# Patient Record
Sex: FEMALE
[De-identification: names, ages, dates, MRNs, and addresses within clinical notes are randomized per-mention and may not be internally consistent; named-entity substitution may affect disease eponyms.]

---

## 2017-09-11 ENCOUNTER — APPOINTMENT (OUTPATIENT)
Dept: PULMONOLOGY | Facility: CLINIC | Age: 67
End: 2017-09-11
Payer: MEDICARE

## 2017-09-11 VITALS
TEMPERATURE: 98.6 F | DIASTOLIC BLOOD PRESSURE: 64 MMHG | RESPIRATION RATE: 16 BRPM | HEART RATE: 70 BPM | WEIGHT: 278 LBS | OXYGEN SATURATION: 96 % | BODY MASS INDEX: 49.26 KG/M2 | SYSTOLIC BLOOD PRESSURE: 156 MMHG | HEIGHT: 63 IN

## 2017-09-11 DIAGNOSIS — Z86.39 PERSONAL HISTORY OF OTHER ENDOCRINE, NUTRITIONAL AND METABOLIC DISEASE: ICD-10-CM

## 2017-09-11 DIAGNOSIS — Z87.891 PERSONAL HISTORY OF NICOTINE DEPENDENCE: ICD-10-CM

## 2017-09-11 DIAGNOSIS — Z87.442 PERSONAL HISTORY OF URINARY CALCULI: ICD-10-CM

## 2017-09-11 DIAGNOSIS — Z01.818 ENCOUNTER FOR OTHER PREPROCEDURAL EXAMINATION: ICD-10-CM

## 2017-09-11 DIAGNOSIS — B45.0 PULMONARY CRYPTOCOCCOSIS: ICD-10-CM

## 2017-09-11 PROCEDURE — 94727 GAS DIL/WSHOT DETER LNG VOL: CPT

## 2017-09-11 PROCEDURE — 99204 OFFICE O/P NEW MOD 45 MIN: CPT | Mod: 25

## 2017-09-11 PROCEDURE — 94729 DIFFUSING CAPACITY: CPT

## 2017-09-11 PROCEDURE — 94060 EVALUATION OF WHEEZING: CPT

## 2017-09-12 ENCOUNTER — TRANSCRIPTION ENCOUNTER (OUTPATIENT)
Age: 67
End: 2017-09-12

## 2019-03-18 VITALS
WEIGHT: 192.68 LBS | HEART RATE: 51 BPM | TEMPERATURE: 97 F | RESPIRATION RATE: 16 BRPM | SYSTOLIC BLOOD PRESSURE: 189 MMHG | OXYGEN SATURATION: 99 % | HEIGHT: 63 IN | DIASTOLIC BLOOD PRESSURE: 79 MMHG

## 2019-03-18 NOTE — ASU PREOP CHECKLIST - SELECT TESTS ORDERED
2/28/19 C&S/CMP/CXR/PT/PTT/INR/CBC/Urinalysis/EKG CXR/POCT Blood Glucose/CBC/2/28/19 C&S, /PT/PTT/CMP/Urinalysis/INR/EKG

## 2019-03-18 NOTE — ASU PATIENT PROFILE, ADULT - PMH
Degenerative disc disease, lumbar    Diabetes Mellitus II (ICD9 250.00)  new onset 1 month ago, diet control  last fingerstick 107  GERD (gastroesophageal reflux disease)    HTN (Hypertension) (ICD9 401.9)    Hyperlipemia    Morbid Obesity (ICD9 278.01)    Psoriasis (ICD9 696.1)  soritane   20 mg  Renal calculi

## 2019-03-18 NOTE — ASU PATIENT PROFILE, ADULT - PSH
Finger Swelling/Mass (ICD9 729.81)  Left Index  History of hand surgery    Renal Calculi  Percutaneous Stone Extraction(ICD9 592.0)  Right and Left  S/P Appendectomy (ICD9 V45.89)    S/P Cholecystectomy (ICD9 V45.79)    S/P Thoracotomy (ICD9 V45.89)  left lung for mass (cryptococcus)  S/P Tonsillectomy (ICD9 V45.89) Bilateral cataracts  2014  Finger Swelling/Mass (ICD9 729.81)  Left Index  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  Renal Calculi  Percutaneous Stone Extraction(ICD9 592.0)  Right and Left  S/P Appendectomy (ICD9 V45.89)    S/P Cholecystectomy (ICD9 V45.79)    S/P Thoracotomy (ICD9 V45.89)  left lung for mass (cryptococcus) 2009  S/P Tonsillectomy (ICD9 V45.89)

## 2019-03-19 ENCOUNTER — OUTPATIENT (OUTPATIENT)
Dept: OUTPATIENT SERVICES | Facility: HOSPITAL | Age: 69
LOS: 1 days | Discharge: HOME | End: 2019-03-19
Payer: MEDICARE

## 2019-03-19 ENCOUNTER — TRANSCRIPTION ENCOUNTER (OUTPATIENT)
Age: 69
End: 2019-03-19

## 2019-03-19 VITALS
SYSTOLIC BLOOD PRESSURE: 127 MMHG | DIASTOLIC BLOOD PRESSURE: 66 MMHG | HEART RATE: 57 BPM | RESPIRATION RATE: 16 BRPM | OXYGEN SATURATION: 98 %

## 2019-03-19 DIAGNOSIS — Z90.3 ACQUIRED ABSENCE OF STOMACH [PART OF]: Chronic | ICD-10-CM

## 2019-03-19 DIAGNOSIS — H26.9 UNSPECIFIED CATARACT: Chronic | ICD-10-CM

## 2019-03-19 LAB — GLUCOSE BLDC GLUCOMTR-MCNC: 118 MG/DL — HIGH (ref 70–99)

## 2019-03-19 PROCEDURE — 29826 SHO ARTHRS SRG DECOMPRESSION: CPT | Mod: RT

## 2019-03-19 PROCEDURE — 82962 GLUCOSE BLOOD TEST: CPT

## 2019-03-19 PROCEDURE — 29824 SHO ARTHRS SRG DSTL CLAVICLC: CPT | Mod: RT

## 2019-03-19 PROCEDURE — 29827 SHO ARTHRS SRG RT8TR CUF RPR: CPT | Mod: RT

## 2019-03-19 RX ORDER — OXYCODONE AND ACETAMINOPHEN 5; 325 MG/1; MG/1
1 TABLET ORAL EVERY 4 HOURS
Qty: 0 | Refills: 0 | Status: DISCONTINUED | OUTPATIENT
Start: 2019-03-19 | End: 2019-03-19

## 2019-03-19 RX ORDER — CEPHALEXIN 500 MG
1 CAPSULE ORAL
Qty: 28 | Refills: 0
Start: 2019-03-19 | End: 2019-03-25

## 2019-03-19 RX ORDER — CEPHALEXIN 500 MG
1 CAPSULE ORAL
Qty: 28 | Refills: 0 | OUTPATIENT
Start: 2019-03-19 | End: 2019-03-25

## 2019-03-19 NOTE — PACU DISCHARGE NOTE - COMMENTS
d/c instruction provided and reviewed with verbal understanding..patient moving right hanf fingers with + senation. cap refill < 3 sec and warm to touch

## 2019-10-28 PROBLEM — N20.0 CALCULUS OF KIDNEY: Chronic | Status: ACTIVE | Noted: 2019-03-18

## 2019-10-28 PROBLEM — K21.9 GASTRO-ESOPHAGEAL REFLUX DISEASE WITHOUT ESOPHAGITIS: Chronic | Status: ACTIVE | Noted: 2019-03-18

## 2019-11-04 ENCOUNTER — APPOINTMENT (OUTPATIENT)
Dept: ORTHOPEDIC SURGERY | Facility: CLINIC | Age: 69
End: 2019-11-04
Payer: MEDICARE

## 2019-11-04 VITALS — SYSTOLIC BLOOD PRESSURE: 172 MMHG | DIASTOLIC BLOOD PRESSURE: 91 MMHG | HEART RATE: 67 BPM

## 2019-11-04 PROCEDURE — 99204 OFFICE O/P NEW MOD 45 MIN: CPT

## 2019-11-04 NOTE — DISCUSSION/SUMMARY
[de-identified] : Referral to Dr. Swain for injections- pain management \par bilateral sacroiliitis\par all options discussed\par seeing pain management\par All options were discussed including rest, medicine, chiropractor, acupuncture, , PT , pain management, and last resort surgery. \par All questions were answered, all alternatives were discussed and the patient is in complete agreement with that plan. Follow-up appointment as instructed. Any issues and the patient will call or come in sooner.\par

## 2019-11-04 NOTE — HISTORY OF PRESENT ILLNESS
[Stable] : stable [de-identified] : 69 year old female presents for initial evaluation severe lower back pain x \par Status post lumbar L4-L5 fusion Dr Lemus 8/02/2016 [facility]\par spine injection 6/19/2019 [facility] \par no fever, chills, sweats, nausea vomiting no bowel movement or bladder dysfunction, no recent weight loss or gain no night pain. the history is in addition to the intake form i personally reviewed.\par

## 2019-11-04 NOTE — PHYSICAL EXAM
[Normal] : Gait: normal [de-identified] : 5 out of 5 motor strength, sensation is intact and symmetrical full range of motion flexion extension and rotation, no palpatory tenderness full range of motion of hips knees shoulders and elbows, no atrophy, negative straight leg raise, no pathological reflexes, no swelling, normal ambulation, no apparent distress, skin is intact, no palpable lymph nodes, no upper or lower extremity instability, alert and oriented x3 and normal mood. normal finger to nose test.\par Pain over bilateral SI joint.  [de-identified] : MRI lumbar-prior lumbar L4-5 fusion-reviewed with patient and

## 2019-11-04 NOTE — ADDENDUM
[FreeTextEntry1] : I, Min Alejandra, acted solely as a scribe for Dr. Nash on 11/04/2019  .\par  \par All medical record entries made by the scribe were at my, Dr. Nash, direction and personally dictated by me on 11/04/2019. I have reviewed the chart and agree that the record accurately reflects my personal performance of the history, physical exam, assessment and plan. I have also personally directed, reviewed, and agreed with the chart.\par

## 2020-03-03 ENCOUNTER — APPOINTMENT (OUTPATIENT)
Dept: ORTHOPEDIC SURGERY | Facility: CLINIC | Age: 70
End: 2020-03-03
Payer: MEDICARE

## 2020-03-03 VITALS
WEIGHT: 210 LBS | HEART RATE: 61 BPM | HEIGHT: 63 IN | BODY MASS INDEX: 37.21 KG/M2 | SYSTOLIC BLOOD PRESSURE: 186 MMHG | DIASTOLIC BLOOD PRESSURE: 77 MMHG

## 2020-03-03 PROCEDURE — 20610 DRAIN/INJ JOINT/BURSA W/O US: CPT | Mod: RT

## 2020-03-03 PROCEDURE — 99204 OFFICE O/P NEW MOD 45 MIN: CPT | Mod: 25

## 2020-03-04 NOTE — DISCUSSION/SUMMARY
[de-identified] : Discussed findings of today's exam and possible causes of patient's pain.  Educated patient on their most probable diagnosis of right shoulder impingement.  Reviewed possible courses of treatment, and we collaboratively decided best course of treatment at this time will include cortisone injection today (see procedure note).  Informed the patient that the numbing medicine in today's injection will last for about 4-6 hours. The steroid that was injected will start to work in 1 to 2 days, peak at 1-2 weeks, and may last up to 1-2 months.  Patient will be started on a course of physical therapy to restore normal range of motion and strength as tolerated.  Follow up as needed.  Patient appreciates and agrees with current plan.\par \par This note was generated using dragon medical dictation software.  A reasonable effort has been made for proofreading its contents, but typos may still remain.  If there are any questions or points of clarification needed please notify my office.

## 2020-03-04 NOTE — PROCEDURE
[de-identified] : Injection: Right  Shoulder Subacromial Space.\par Indication: Impingement.\par \par A discussion was had with the patient regarding this procedure and all questions were answered. All risks, benefits and alternatives were discussed. These included but were not limited to bleeding, infection, and allergic reaction.  A timeout was done to ensure correct side and pt agreed to the procedure.   Alcohol was used to clean the skin, and betadine was used to sterilize and prep the area in the posterior aspect of the shoulder. Ethyl chloride spray was then used as a topical anesthetic. A 22-gauge 1.5" needle was used to inject 2cc of 1% lidocaine without epinephrine and 1cc of 40mg/ml methylprednisolone into the subacromial space. A sterile bandage was then applied. The patient tolerated the procedure well and there were no complications.\par

## 2020-03-04 NOTE — HISTORY OF PRESENT ILLNESS
[de-identified] : Patient is here for right shoulder pain that began on 3/1/20 without inciting injury. She went to urgent care where xrays were taken that were negative for fracture. She has used Tylenol and topical treatments for her pain. She had an arthroscopic debridement in this shoulder about 4 years ago but has not had difficulty with it since. Denies N/T/R/Prior injury. \par \par The patient's past medical history, past surgical history, medications and allergies were reviewed by me today and documented accordingly. In addition, the patient's family and social history, which were noncontributory to this visit, were reviewed also. Intake form was reviewed. The patient has no family history of arthritis.

## 2020-03-04 NOTE — PHYSICAL EXAM
[de-identified] : Constitutional: Well-nourished, well-developed, No acute distress\par Respiratory:  Good respiratory effort, no SOB\par Lymphatic: No regional lymphadenopathy, no lymphedema\par Psychiatric: Pleasant and normal affect, alert and oriented x3\par Skin: Clean dry and intact B/L UE/LE\par Musculoskeletal: normal except where as noted in regional exam\par \par \par Left Shoulder:\par APPEARANCE: no marked deformities, no swelling or malalignment\par POSITIVE TENDERNESS: none\par NONTENDER: supraspinatus, infraspinatus, teres minor, LH biceps, anterior and posterior capsule, AC joint\par ROM: full & painless, no scapular winging or dyskinesia present\par RESISTIVE TESTING: painless 5/5 resisted flex/ext, empty can/ER/IR, horizontal abd/add \par SPECIAL TESTS: neg Drop Arm, neg Empty Can, neg Gordillo/Neers, neg Cohen's, neg Speeds, neg Apprehension, neg cross arm adduction, neg apley's scratch test\par Vasc: 2+ radial pulse\par Neuro: AIN, PIN, Ulnar nerve intact to motor, DTRs 2+/4 biceps, triceps, brachioradialis\par Sensation: Intact to light touch throughout\par B/L Elbows:  No asymmetry, malalignment, or swelling, Full ROM, 5/5 strength in flexion/ext, pronation/supination, Joints stable\par B/L Wrist and Hand:  No asymmetry, malalignment, or swelling, Full ROM, 5/5 strength in wrist and long finger flexion/ext, radial/ulnar deviation, Joints stable\par \par Right Shoulder:\par APPEARANCE: no marked deformities, no swelling or malalignment\par POSITIVE TENDERNESS: supraspinatus, long head biceps tendon\par NONTENDER:  infraspinatus, teres minor. biceps. anterior and posterior capsule. AC joint. \par ROM: full with mild painful arc past 60 degrees, no scapular winging or dyskinesia present\par RESISTIVE TESTING: MMT 4+/5 ER, Flexion and Empty can, 5/5 IR. painless 5/5 resisted ext, horizontal abd/add \par SPECIAL TESTS: + Gordillo and Neers, mildly + cross arm adduction, + Speeds, neg Cohen's, neg Drop Arm, neg Apprehension. neg apley's scratch test\par Vasc: 2+ radial pulse\par Neuro: AIN, PIN, Ulnar nerve intact to motor, DTRs 2+/4 biceps, triceps, brachioradialis\par Sensation: Intact to light touch throughout\par  [de-identified] : I reviewed and clinically correlated the following outside imaging studies,\par Outside x-rays, 3 views of the right shoulder, evidence of mild glenohumeral and AC joint osteoarthritis

## 2020-08-07 ENCOUNTER — APPOINTMENT (OUTPATIENT)
Dept: ORTHOPEDIC SURGERY | Facility: CLINIC | Age: 70
End: 2020-08-07
Payer: MEDICARE

## 2020-08-07 VITALS
HEART RATE: 67 BPM | HEIGHT: 63 IN | DIASTOLIC BLOOD PRESSURE: 71 MMHG | SYSTOLIC BLOOD PRESSURE: 194 MMHG | WEIGHT: 210 LBS | BODY MASS INDEX: 37.21 KG/M2 | TEMPERATURE: 98.2 F

## 2020-08-07 PROCEDURE — 99214 OFFICE O/P EST MOD 30 MIN: CPT

## 2020-08-17 ENCOUNTER — APPOINTMENT (OUTPATIENT)
Dept: ORTHOPEDIC SURGERY | Facility: CLINIC | Age: 70
End: 2020-08-17
Payer: MEDICARE

## 2020-08-17 VITALS — TEMPERATURE: 97.2 F

## 2020-08-17 VITALS
BODY MASS INDEX: 37.21 KG/M2 | WEIGHT: 210 LBS | HEIGHT: 63 IN | HEART RATE: 61 BPM | SYSTOLIC BLOOD PRESSURE: 185 MMHG | DIASTOLIC BLOOD PRESSURE: 66 MMHG

## 2020-08-17 PROCEDURE — 99214 OFFICE O/P EST MOD 30 MIN: CPT

## 2020-08-21 ENCOUNTER — APPOINTMENT (OUTPATIENT)
Dept: ORTHOPEDIC SURGERY | Facility: CLINIC | Age: 70
End: 2020-08-21
Payer: MEDICARE

## 2020-08-21 PROCEDURE — 20610 DRAIN/INJ JOINT/BURSA W/O US: CPT | Mod: RT

## 2020-08-21 PROCEDURE — 99213 OFFICE O/P EST LOW 20 MIN: CPT | Mod: 25

## 2020-08-21 NOTE — PROCEDURE
[de-identified] : Injection: Right  Shoulder Subacromial Space.\par Indication: Impingement.\par \par A discussion was had with the patient regarding this procedure and all questions were answered. All risks, benefits and alternatives were discussed. These included but were not limited to bleeding, infection, and allergic reaction.  A timeout was done to ensure correct side and pt agreed to the procedure.   Alcohol was used to clean the skin, and betadine was used to sterilize and prep the area in the posterior aspect of the shoulder. Ethyl chloride spray was then used as a topical anesthetic. A 22-gauge 1.5" needle was used to inject 2cc of 1% lidocaine without epinephrine and 1cc of 40mg/ml methylprednisolone into the subacromial space. A sterile bandage was then applied. The patient tolerated the procedure well and there were no complications.\par

## 2020-08-21 NOTE — PHYSICAL EXAM
[de-identified] : Constitutional: Well-nourished, well-developed, No acute distress\par Respiratory:  Good respiratory effort, no SOB\par Lymphatic: No regional lymphadenopathy, no lymphedema\par Psychiatric: Pleasant and normal affect, alert and oriented x3\par Skin: Clean dry and intact B/L UE/LE\par Musculoskeletal: normal except where as noted in regional exam\par \par Right Shoulder:\par APPEARANCE: no marked deformities, no swelling or malalignment\par POSITIVE TENDERNESS: supraspinatus, long head biceps tendon\par NONTENDER:  infraspinatus, teres minor. biceps. anterior and posterior capsule. AC joint. \par ROM: full with mild painful arc past 60 degrees, no scapular winging or dyskinesia present\par RESISTIVE TESTING: MMT 4+/5 ER, Flexion and Empty can, 5/5 IR. painless 5/5 resisted ext, horizontal abd/add \par SPECIAL TESTS: + Gordillo and Neers, mildly + cross arm adduction, + Speeds, neg Cohen's, neg Drop Arm, neg Apprehension. neg apley's scratch test\par Vasc: 2+ radial pulse\par Neuro: AIN, PIN, Ulnar nerve intact to motor, DTRs 2+/4 biceps, triceps, brachioradialis\par Sensation: Intact to light touch throughout\par

## 2020-08-21 NOTE — PHYSICAL EXAM
[de-identified] : Constitutional: Well-nourished, well-developed, No acute distress\par Respiratory:  Good respiratory effort, no SOB\par Lymphatic: No regional lymphadenopathy, no lymphedema\par Psychiatric: Pleasant and normal affect, alert and oriented x3\par Skin: Clean dry and intact B/L UE/LE\par Musculoskeletal: normal except where as noted in regional exam\par \par Right Shoulder:\par APPEARANCE: no marked deformities, no swelling or malalignment\par POSITIVE TENDERNESS: supraspinatus, long head biceps tendon\par NONTENDER:  infraspinatus, teres minor. biceps. anterior and posterior capsule. AC joint. \par ROM: full with mild painful arc past 60 degrees, no scapular winging or dyskinesia present\par RESISTIVE TESTING: MMT 4+/5 ER, Flexion and Empty can, 5/5 IR. painless 5/5 resisted ext, horizontal abd/add \par SPECIAL TESTS: + Gordillo and Neers, mildly + cross arm adduction, + Speeds, neg Cohen's, neg Drop Arm, neg Apprehension. neg apley's scratch test\par Vasc: 2+ radial pulse\par Neuro: AIN, PIN, Ulnar nerve intact to motor, DTRs 2+/4 biceps, triceps, brachioradialis\par Sensation: Intact to light touch throughout\par

## 2020-08-21 NOTE — DISCUSSION/SUMMARY
[de-identified] : Patient was seen today for reevaluation of right shoulder pain secondary to recurrence of subacromial impingement.  We discussed various treatment options as well as associated risk/benefits/alternatives and patient elected to proceed with cortisone injection today (see procedure note).  Informed the patient that the numbing medicine in today's injection will last for about 4-6 hours. The steroid that was injected will start to work in 1 to 2 days, peak at 1-2 weeks, and may last up to 1-2 months.  Patient will be started on a course of physical therapy to restore normal range of motion and strength as tolerated.  Follow up as needed.  Patient appreciates and agrees with current plan.\par \par This note was generated using dragon medical dictation software.  A reasonable effort has been made for proofreading its contents, but typos may still remain.  If there are any questions or points of clarification needed please notify my office.

## 2020-08-21 NOTE — DISCUSSION/SUMMARY
[de-identified] : Patient was seen today for reevaluation of right shoulder pain secondary to recurrence of subacromial impingement.  We discussed various treatment options as well as associated risk/benefits/alternatives and patient elected to proceed with cortisone injection today (see procedure note).  Informed the patient that the numbing medicine in today's injection will last for about 4-6 hours. The steroid that was injected will start to work in 1 to 2 days, peak at 1-2 weeks, and may last up to 1-2 months.  Patient will be started on a course of physical therapy to restore normal range of motion and strength as tolerated.  Follow up as needed.  Patient appreciates and agrees with current plan.\par \par This note was generated using dragon medical dictation software.  A reasonable effort has been made for proofreading its contents, but typos may still remain.  If there are any questions or points of clarification needed please notify my office.

## 2020-08-21 NOTE — HISTORY OF PRESENT ILLNESS
[Pain Location] : pain [de-identified] : Patient is a 69 year old female who presents for follow up of right shoulder impingement.  patient was last seen in March and had injection which resolved pain until 1 week ago.  patient using tylenol and aleve with some relief.  no radation of pain, no weakness.  patient did attend PT after visit in March with relief

## 2020-08-21 NOTE — PROCEDURE
[de-identified] : Injection: Right  Shoulder Subacromial Space.\par Indication: Impingement.\par \par A discussion was had with the patient regarding this procedure and all questions were answered. All risks, benefits and alternatives were discussed. These included but were not limited to bleeding, infection, and allergic reaction.  A timeout was done to ensure correct side and pt agreed to the procedure.   Alcohol was used to clean the skin, and betadine was used to sterilize and prep the area in the posterior aspect of the shoulder. Ethyl chloride spray was then used as a topical anesthetic. A 22-gauge 1.5" needle was used to inject 2cc of 1% lidocaine without epinephrine and 1cc of 40mg/ml methylprednisolone into the subacromial space. A sterile bandage was then applied. The patient tolerated the procedure well and there were no complications.\par

## 2020-08-21 NOTE — HISTORY OF PRESENT ILLNESS
[Pain Location] : pain [de-identified] : Patient is a 69 year old female who presents for follow up of right shoulder impingement.  patient was last seen in March and had injection which resolved pain until 1 week ago.  patient using tylenol and aleve with some relief.  no radation of pain, no weakness.  patient did attend PT after visit in March with relief

## 2020-10-19 ENCOUNTER — APPOINTMENT (OUTPATIENT)
Dept: ORTHOPEDIC SURGERY | Facility: CLINIC | Age: 70
End: 2020-10-19
Payer: MEDICARE

## 2020-10-19 DIAGNOSIS — M75.41 IMPINGEMENT SYNDROME OF RIGHT SHOULDER: ICD-10-CM

## 2020-10-19 PROCEDURE — 99072 ADDL SUPL MATRL&STAF TM PHE: CPT

## 2020-10-19 PROCEDURE — 99214 OFFICE O/P EST MOD 30 MIN: CPT | Mod: 25

## 2020-10-19 PROCEDURE — 20550 NJX 1 TENDON SHEATH/LIGAMENT: CPT | Mod: F5

## 2020-10-19 NOTE — PROCEDURE
[de-identified] : Injection: Right 1st digit.\par Indication: Trigger finger.\par \par A discussion was had with the patient regarding this procedure and all questions were answered. All risks, benefits and alternatives were discussed. These included but were not limited to bleeding, infection, and allergic reaction. A timeout was performed prior to the procedure to ensure proper side and location.  Alcohol was used to clean and sterilize the skin over the volar aspect of the MCP. A 25-gauge needle was used to inject 0.5cc of 0.25% bupivacaine and 1cc of 6mg/mL betamethasone into the flexor tendon sheath. A sterile bandage was then applied. The patient tolerated the procedure well and there were no complications.\par

## 2020-10-19 NOTE — PHYSICAL EXAM
[de-identified] : Constitutional: Well-nourished, well-developed, No acute distress\par Respiratory:  Good respiratory effort, no SOB\par Lymphatic: No regional lymphadenopathy, no lymphedema\par Psychiatric: Pleasant and normal affect, alert and oriented x3\par Skin: Clean dry and intact B/L UE/LE\par Musculoskeletal: normal except where as noted in regional exam\par \par Right Shoulder:\par APPEARANCE: no marked deformities, no swelling or malalignment\par POSITIVE TENDERNESS: supraspinatus, long head biceps tendon\par NONTENDER:  infraspinatus, teres minor. biceps. anterior and posterior capsule. AC joint. \par ROM: full with mild painful arc past 60 degrees, no scapular winging or dyskinesia present\par RESISTIVE TESTING: MMT 4+/5 ER, Flexion and Empty can, 5/5 IR. painless 5/5 resisted ext, horizontal abd/add \par SPECIAL TESTS: + Gordillo and Neers, mildly + cross arm adduction, + Speeds, neg Cohen's, neg Drop Arm, neg Apprehension. neg apley's scratch test\par Vasc: 2+ radial pulse\par Neuro: AIN, PIN, Ulnar nerve intact to motor, DTRs 2+/4 biceps, triceps, brachioradialis\par Sensation: Intact to light touch throughout\par \par Right hand:\par APPEARANCE: no marked deformities, no swelling or malalignment\par POSITIVE TENDERNESS: mild along flexor tendon and sheath superficial to the MCP at the first digit\par NONTENDER: osseous and ligamentous structures. \par ROM: full & painless although + triggering of the involved finger going from full flexion into extension. \par RESISTIVE TESTING: painless resisted testing. \par SPECIAL TESTS: normal sensation of 1st through 5th digits, normal flex/ext, abd/add, and opposition of thumb, no triggering of other fingers\par

## 2020-10-19 NOTE — HISTORY OF PRESENT ILLNESS
[de-identified] : RHD Patient is here for right hand 1st digit pain that began about 3 weeks ago without inciting injury. She has noticed clicking when extending the finger. Pain is worse in the morning. She has used Tylenol and heat to treat this pain. She cannot take NSAIDs due to her bariatric surgery. Denies N/T/R/Prior injury. \par \par She has noticed some improvement in her shoulder since the injection. She is attending PT and is doing exercises on her own. There has been no recent injury. She was recommended to return for reevaluation. \par

## 2020-11-23 ENCOUNTER — APPOINTMENT (OUTPATIENT)
Dept: ORTHOPEDIC SURGERY | Facility: CLINIC | Age: 70
End: 2020-11-23
Payer: MEDICARE

## 2020-11-23 PROCEDURE — 99214 OFFICE O/P EST MOD 30 MIN: CPT

## 2020-12-14 ENCOUNTER — APPOINTMENT (OUTPATIENT)
Dept: ORTHOPEDIC SURGERY | Facility: CLINIC | Age: 70
End: 2020-12-14
Payer: MEDICARE

## 2020-12-14 VITALS
HEART RATE: 73 BPM | WEIGHT: 210 LBS | DIASTOLIC BLOOD PRESSURE: 77 MMHG | BODY MASS INDEX: 37.21 KG/M2 | SYSTOLIC BLOOD PRESSURE: 195 MMHG | HEIGHT: 63 IN

## 2020-12-14 VITALS — TEMPERATURE: 96.1 F

## 2020-12-14 PROCEDURE — 99072 ADDL SUPL MATRL&STAF TM PHE: CPT

## 2020-12-14 PROCEDURE — 99214 OFFICE O/P EST MOD 30 MIN: CPT

## 2020-12-31 ENCOUNTER — TRANSCRIPTION ENCOUNTER (OUTPATIENT)
Age: 70
End: 2020-12-31

## 2021-01-15 ENCOUNTER — APPOINTMENT (OUTPATIENT)
Dept: ORTHOPEDIC SURGERY | Facility: CLINIC | Age: 71
End: 2021-01-15
Payer: MEDICARE

## 2021-01-15 VITALS — SYSTOLIC BLOOD PRESSURE: 166 MMHG | DIASTOLIC BLOOD PRESSURE: 68 MMHG | HEART RATE: 68 BPM

## 2021-01-15 VITALS — TEMPERATURE: 97.3 F

## 2021-01-15 DIAGNOSIS — M43.16 SPONDYLOLISTHESIS, LUMBAR REGION: ICD-10-CM

## 2021-01-15 DIAGNOSIS — M48.07 SPINAL STENOSIS, LUMBOSACRAL REGION: ICD-10-CM

## 2021-01-15 PROCEDURE — 99214 OFFICE O/P EST MOD 30 MIN: CPT

## 2021-01-15 PROCEDURE — 99072 ADDL SUPL MATRL&STAF TM PHE: CPT

## 2021-02-12 ENCOUNTER — APPOINTMENT (OUTPATIENT)
Dept: ORTHOPEDIC SURGERY | Facility: CLINIC | Age: 71
End: 2021-02-12
Payer: MEDICARE

## 2021-02-12 DIAGNOSIS — M54.16 RADICULOPATHY, LUMBAR REGION: ICD-10-CM

## 2021-02-12 PROCEDURE — 99072 ADDL SUPL MATRL&STAF TM PHE: CPT

## 2021-02-12 PROCEDURE — 99214 OFFICE O/P EST MOD 30 MIN: CPT

## 2021-03-11 ENCOUNTER — APPOINTMENT (OUTPATIENT)
Dept: ORTHOPEDIC SURGERY | Facility: CLINIC | Age: 71
End: 2021-03-11
Payer: MEDICARE

## 2021-03-11 DIAGNOSIS — M65.311 TRIGGER THUMB, RIGHT THUMB: ICD-10-CM

## 2021-03-11 PROCEDURE — 99072 ADDL SUPL MATRL&STAF TM PHE: CPT

## 2021-03-11 PROCEDURE — 20550 NJX 1 TENDON SHEATH/LIGAMENT: CPT | Mod: F5

## 2021-03-11 PROCEDURE — 99213 OFFICE O/P EST LOW 20 MIN: CPT | Mod: 25

## 2021-03-11 NOTE — HISTORY OF PRESENT ILLNESS
[de-identified] : Patient is here for right hand 1st digit pain follow up. She had relief from her last injection but began experiencing triggering again without injury. She has used Lidocaine cream to treat her pain. She would like to proceed with repeat injection.

## 2021-03-11 NOTE — PROCEDURE
[de-identified] : Injection: Right 1st digit.\par Indication: Trigger finger.\par \par A discussion was had with the patient regarding this procedure and all questions were answered. All risks, benefits and alternatives were discussed. These included but were not limited to bleeding, infection, and allergic reaction. A timeout was performed prior to the procedure to ensure proper side and location.  Alcohol was used to clean and sterilize the skin over the volar aspect of the MCP. A 25-gauge needle was used to inject 0.5cc of 0.5% marcaine and 1cc of 6mg/mL betamethasone into the flexor tendon sheath. A sterile bandage was then applied. The patient tolerated the procedure well and there were no complications.

## 2021-03-11 NOTE — PHYSICAL EXAM
[de-identified] : Constitutional: Well-nourished, well-developed, No acute distress\par Respiratory: Good respiratory effort, no SOB\par Lymphatic: No regional lymphadenopathy, no lymphedema\par Psychiatric: Pleasant and normal affect, alert and oriented x3\par Skin: Clean dry and intact B/L UE/LE\par Musculoskeletal: normal except where as noted in regional exam\par \par Right hand:\par APPEARANCE: no marked deformities, no swelling or malalignment\par POSITIVE TENDERNESS: mild along flexor tendon and sheath superficial to the MCP at the first digit\par NONTENDER: osseous and ligamentous structures. \par ROM: full & painless although + triggering of the involved finger going from full flexion into extension. \par RESISTIVE TESTING: painless resisted testing. \par SPECIAL TESTS: normal sensation of 1st through 5th digits, normal flex/ext, abd/add, and opposition of thumb, no triggering of other fingers\par

## 2021-03-11 NOTE — DISCUSSION/SUMMARY
[de-identified] : Patient presents today for continued management of 1st digit trigger finger.  Reviewed possible courses of treatment, and we collaboratively decided best course of treatment at this time will include cortisone injection today (see procedure note).  Informed the patient that the numbing medicine in today's injection will last for about 4-6 hours. The steroid that was injected will start to work in 1 to 2 days, peak at 1-2 weeks, and may last up to 4-6 weeks. Discussed with the patient the expected course of trigger fingers, and the variable response to cortisone injections. Some are relieved with a single injection, while others require repeat injections.  Patient consented to repeat cortisone injection to the involved trigger fingers today.  We will wait and see how patient responds to this cortisone injection. Advised patient if the problem persists I would recommend consultation with hand/wrist surgeon to have a discussion about surgical intervention as further repeat injections are unlikely to be of much benefit.  Patient appreciates and agrees with current plan.\par \par This note was generated using dragon medical dictation software. A reasonable effort has been made for proofreading its contents, but typos may still remain. If there are any questions or points of clarification needed please notify my office.

## 2021-03-25 ENCOUNTER — APPOINTMENT (OUTPATIENT)
Dept: ORTHOPEDIC SURGERY | Facility: CLINIC | Age: 71
End: 2021-03-25
Payer: MEDICARE

## 2021-03-25 PROCEDURE — 20553 NJX 1/MLT TRIGGER POINTS 3/>: CPT

## 2021-03-25 PROCEDURE — 99214 OFFICE O/P EST MOD 30 MIN: CPT | Mod: 25

## 2021-03-25 PROCEDURE — 99072 ADDL SUPL MATRL&STAF TM PHE: CPT

## 2021-05-17 ENCOUNTER — APPOINTMENT (OUTPATIENT)
Dept: ORTHOPEDIC SURGERY | Facility: CLINIC | Age: 71
End: 2021-05-17
Payer: MEDICARE

## 2021-05-17 VITALS
HEIGHT: 63 IN | WEIGHT: 220 LBS | HEART RATE: 74 BPM | SYSTOLIC BLOOD PRESSURE: 192 MMHG | BODY MASS INDEX: 38.98 KG/M2 | DIASTOLIC BLOOD PRESSURE: 77 MMHG

## 2021-05-17 DIAGNOSIS — M60.9 MYOSITIS, UNSPECIFIED: ICD-10-CM

## 2021-05-17 DIAGNOSIS — M79.10 MYALGIA, UNSPECIFIED SITE: ICD-10-CM

## 2021-05-17 PROCEDURE — 99214 OFFICE O/P EST MOD 30 MIN: CPT | Mod: 25

## 2021-05-17 PROCEDURE — 99072 ADDL SUPL MATRL&STAF TM PHE: CPT

## 2021-05-17 PROCEDURE — 20552 NJX 1/MLT TRIGGER POINT 1/2: CPT

## 2021-06-17 ENCOUNTER — APPOINTMENT (OUTPATIENT)
Dept: CARDIOLOGY | Facility: CLINIC | Age: 71
End: 2021-06-17
Payer: MEDICARE

## 2021-06-17 VITALS
SYSTOLIC BLOOD PRESSURE: 134 MMHG | BODY MASS INDEX: 38.62 KG/M2 | RESPIRATION RATE: 16 BRPM | HEIGHT: 63 IN | DIASTOLIC BLOOD PRESSURE: 85 MMHG | WEIGHT: 218 LBS | HEART RATE: 74 BPM | TEMPERATURE: 97.9 F | OXYGEN SATURATION: 95 %

## 2021-06-17 DIAGNOSIS — Z82.49 FAMILY HISTORY OF ISCHEMIC HEART DISEASE AND OTHER DISEASES OF THE CIRCULATORY SYSTEM: ICD-10-CM

## 2021-06-17 PROCEDURE — 99204 OFFICE O/P NEW MOD 45 MIN: CPT

## 2021-06-17 PROCEDURE — 93000 ELECTROCARDIOGRAM COMPLETE: CPT

## 2021-06-17 PROCEDURE — 99072 ADDL SUPL MATRL&STAF TM PHE: CPT

## 2021-06-17 RX ORDER — METHOTREXATE 2.5 MG/1
TABLET ORAL
Refills: 0 | Status: COMPLETED | COMMUNITY
End: 2021-06-17

## 2021-06-17 RX ORDER — HALOBETASOL PROPIONATE 0.5 MG/G
0.05 OINTMENT TOPICAL
Qty: 50 | Refills: 0 | Status: COMPLETED | COMMUNITY
Start: 2020-01-29 | End: 2021-06-17

## 2021-06-17 RX ORDER — METOPROLOL SUCCINATE 50 MG/1
50 TABLET, EXTENDED RELEASE ORAL
Refills: 0 | Status: COMPLETED | COMMUNITY
End: 2021-06-17

## 2021-06-17 RX ORDER — AMOXICILLIN 500 MG/1
500 CAPSULE ORAL
Qty: 42 | Refills: 0 | Status: COMPLETED | COMMUNITY
Start: 2019-11-05 | End: 2021-06-17

## 2021-06-17 RX ORDER — LISINOPRIL AND HYDROCHLOROTHIAZIDE TABLETS 20; 12.5 MG/1; MG/1
20-12.5 TABLET ORAL
Refills: 0 | Status: COMPLETED | COMMUNITY
End: 2021-06-17

## 2021-06-17 RX ORDER — NAPROXEN 500 MG/1
500 TABLET ORAL
Qty: 1 | Refills: 1 | Status: COMPLETED | COMMUNITY
Start: 2020-12-14 | End: 2021-06-17

## 2021-06-17 RX ORDER — METHYLPREDNISOLONE 4 MG/1
4 TABLET ORAL
Qty: 1 | Refills: 1 | Status: COMPLETED | COMMUNITY
Start: 2021-01-15 | End: 2021-06-17

## 2021-06-17 RX ORDER — AMMONIUM LACTATE 12 %
12 CREAM (GRAM) TOPICAL
Qty: 385 | Refills: 0 | Status: COMPLETED | COMMUNITY
Start: 2020-01-29 | End: 2021-06-17

## 2021-06-17 RX ORDER — PNEUMOCOCCAL 23-VAL P-SAC VAC 25MCG/0.5
25 VIAL (ML) INJECTION
Qty: 1 | Refills: 0 | Status: COMPLETED | COMMUNITY
Start: 2019-12-21 | End: 2021-06-17

## 2021-06-17 RX ORDER — METHYLPREDNISOLONE 4 MG/1
4 TABLET ORAL
Qty: 1 | Refills: 1 | Status: COMPLETED | COMMUNITY
Start: 2020-11-23 | End: 2021-06-17

## 2021-06-17 RX ORDER — ATORVASTATIN CALCIUM 20 MG/1
20 TABLET, FILM COATED ORAL
Refills: 0 | Status: COMPLETED | COMMUNITY
End: 2021-06-17

## 2021-06-17 RX ORDER — GABAPENTIN 100 MG/1
100 CAPSULE ORAL
Qty: 60 | Refills: 0 | Status: COMPLETED | COMMUNITY
Start: 2019-11-26 | End: 2021-06-17

## 2021-06-17 RX ORDER — DICLOFENAC SODIUM 10 MG/G
1 GEL TOPICAL
Qty: 300 | Refills: 0 | Status: COMPLETED | COMMUNITY
Start: 2020-01-07 | End: 2021-06-17

## 2021-06-17 RX ORDER — METHYLPREDNISOLONE 4 MG/1
4 TABLET ORAL
Qty: 1 | Refills: 0 | Status: COMPLETED | COMMUNITY
Start: 2021-05-27 | End: 2021-06-17

## 2021-06-17 RX ORDER — NAPROXEN 500 MG/1
500 TABLET ORAL
Qty: 60 | Refills: 0 | Status: COMPLETED | COMMUNITY
Start: 2020-08-20 | End: 2021-06-17

## 2021-06-17 NOTE — DISCUSSION/SUMMARY
[FreeTextEntry1] : This is a 70-year-old female past medical history significant for non-insulin-dependent diabetes mellitus, hypertension, hyperlipidemia, status post bariatric surgery, gastric sleeve in 2018, status post spinal fusion surgery, status post cholecystectomy, nephrolithiasis, status post cataract surgery, who comes in for cardiac consultation.  \par She has no history of rheumatic fever and does not drink excessive caffeine or alcohol.\par The patient has been having at least a one-month history of left sided chest pain.  The pain is best described as dull in nature but sometimes can be sharp and not associated with any particular activity.  She is sitting at home in the evening and feel is pain below the left clavicle area.\par Her cardiac risk factors include hypertension, hyperlipidemia, psoriasis, history of smoking approximately 2 packs per week, she stopped 20 years ago), and diabetes mellitus.\par The patient had a history of non-insulin-dependent diabetes mellitus, which improved after bariatric gastric sleeve surgery.\par EKG done June 17, 2021 demonstrated normal sinus rhythm rate 74 beats per minutes otherwise remarkable for T wave inversion in V1 and V2.\par The patient will schedule a pharmacologic nuclear stress test rule out significant coronary artery disease.  The patient is unable to walk on a treadmill secondary to her chronic low back pain and spinal fusion.  She does walk with the assistance of 18.\par She will schedule echo Doppler examination to evaluate her left ventricular function, murmur, rule out hypertrophy.\par 2 we will also schedule a carotid Doppler examination if she may have a left carotid bruit versus transmitted murmur.\par She will get me a copy of the most recent blood work from Dr. Fleming.\par She will followup with me at the above noted diagnostic tests are completed.\par \par The patient understands that aerobic exercises must be increased to 40 minutes 4 times per week. A detailed discussion of lifestyle modification was done today. The patient has a good understanding of the diagnosis, and treatment plan. Lifestyle modification was also outlined.

## 2021-06-17 NOTE — REASON FOR VISIT
[CV Risk Factors and Non-Cardiac Disease] : CV risk factors and non-cardiac disease [Hyperlipidemia] : hyperlipidemia [Hypertension] : hypertension [Other: ____] : [unfilled] [FreeTextEntry3] : DR ARENAS

## 2021-06-21 ENCOUNTER — NON-APPOINTMENT (OUTPATIENT)
Age: 71
End: 2021-06-21

## 2021-07-15 ENCOUNTER — APPOINTMENT (OUTPATIENT)
Dept: CARDIOLOGY | Facility: CLINIC | Age: 71
End: 2021-07-15
Payer: MEDICARE

## 2021-07-15 DIAGNOSIS — R09.89 OTHER SPECIFIED SYMPTOMS AND SIGNS INVOLVING THE CIRCULATORY AND RESPIRATORY SYSTEMS: ICD-10-CM

## 2021-07-15 PROCEDURE — 93306 TTE W/DOPPLER COMPLETE: CPT

## 2021-07-15 PROCEDURE — 93922 UPR/L XTREMITY ART 2 LEVELS: CPT

## 2021-07-15 PROCEDURE — 93880 EXTRACRANIAL BILAT STUDY: CPT

## 2021-07-15 PROCEDURE — 99072 ADDL SUPL MATRL&STAF TM PHE: CPT

## 2021-07-19 ENCOUNTER — APPOINTMENT (OUTPATIENT)
Dept: CARDIOLOGY | Facility: CLINIC | Age: 71
End: 2021-07-19
Payer: MEDICARE

## 2021-07-19 PROCEDURE — 78452 HT MUSCLE IMAGE SPECT MULT: CPT

## 2021-07-19 PROCEDURE — A9500: CPT

## 2021-07-19 PROCEDURE — 93015 CV STRESS TEST SUPVJ I&R: CPT

## 2021-07-19 PROCEDURE — 99072 ADDL SUPL MATRL&STAF TM PHE: CPT

## 2021-08-02 ENCOUNTER — OUTPATIENT (OUTPATIENT)
Dept: OUTPATIENT SERVICES | Facility: HOSPITAL | Age: 71
LOS: 1 days | End: 2021-08-02
Payer: MEDICARE

## 2021-08-02 VITALS
DIASTOLIC BLOOD PRESSURE: 72 MMHG | OXYGEN SATURATION: 98 % | SYSTOLIC BLOOD PRESSURE: 161 MMHG | HEART RATE: 76 BPM | RESPIRATION RATE: 14 BRPM

## 2021-08-02 VITALS
OXYGEN SATURATION: 98 % | SYSTOLIC BLOOD PRESSURE: 189 MMHG | WEIGHT: 222.01 LBS | TEMPERATURE: 98 F | HEART RATE: 79 BPM | DIASTOLIC BLOOD PRESSURE: 88 MMHG | RESPIRATION RATE: 16 BRPM | HEIGHT: 64 IN

## 2021-08-02 DIAGNOSIS — Z98.84 BARIATRIC SURGERY STATUS: Chronic | ICD-10-CM

## 2021-08-02 DIAGNOSIS — Z90.3 ACQUIRED ABSENCE OF STOMACH [PART OF]: Chronic | ICD-10-CM

## 2021-08-02 DIAGNOSIS — R94.39 ABNORMAL RESULT OF OTHER CARDIOVASCULAR FUNCTION STUDY: ICD-10-CM

## 2021-08-02 DIAGNOSIS — Z98.1 ARTHRODESIS STATUS: Chronic | ICD-10-CM

## 2021-08-02 DIAGNOSIS — H26.9 UNSPECIFIED CATARACT: Chronic | ICD-10-CM

## 2021-08-02 LAB
BUN SERPL-MCNC: 14 MG/DL — SIGNIFICANT CHANGE UP (ref 7–23)
CALCIUM SERPL-MCNC: 10.3 MG/DL — SIGNIFICANT CHANGE UP (ref 8.4–10.5)
CHLORIDE SERPL-SCNC: 102 MMOL/L — SIGNIFICANT CHANGE UP (ref 96–108)
CO2 SERPL-SCNC: 28 MMOL/L — SIGNIFICANT CHANGE UP (ref 22–31)
CREAT SERPL-MCNC: 0.51 MG/DL — SIGNIFICANT CHANGE UP (ref 0.5–1.3)
GLUCOSE BLDC GLUCOMTR-MCNC: 137 MG/DL — HIGH (ref 70–99)
GLUCOSE SERPL-MCNC: 141 MG/DL — HIGH (ref 70–99)
HCT VFR BLD CALC: 40.1 % — SIGNIFICANT CHANGE UP (ref 34.5–45)
HGB BLD-MCNC: 12.8 G/DL — SIGNIFICANT CHANGE UP (ref 11.5–15.5)
MCHC RBC-ENTMCNC: 29.4 PG — SIGNIFICANT CHANGE UP (ref 27–34)
MCHC RBC-ENTMCNC: 31.9 GM/DL — LOW (ref 32–36)
MCV RBC AUTO: 92.2 FL — SIGNIFICANT CHANGE UP (ref 80–100)
NRBC # BLD: 0 /100 WBCS — SIGNIFICANT CHANGE UP (ref 0–0)
PLATELET # BLD AUTO: 206 K/UL — SIGNIFICANT CHANGE UP (ref 150–400)
POTASSIUM SERPL-MCNC: 4.5 MMOL/L — SIGNIFICANT CHANGE UP (ref 3.5–5.3)
POTASSIUM SERPL-SCNC: 4.5 MMOL/L — SIGNIFICANT CHANGE UP (ref 3.5–5.3)
RBC # BLD: 4.35 M/UL — SIGNIFICANT CHANGE UP (ref 3.8–5.2)
RBC # FLD: 12.8 % — SIGNIFICANT CHANGE UP (ref 10.3–14.5)
SODIUM SERPL-SCNC: 140 MMOL/L — SIGNIFICANT CHANGE UP (ref 135–145)
WBC # BLD: 10.15 K/UL — SIGNIFICANT CHANGE UP (ref 3.8–10.5)
WBC # FLD AUTO: 10.15 K/UL — SIGNIFICANT CHANGE UP (ref 3.8–10.5)

## 2021-08-02 PROCEDURE — 99152 MOD SED SAME PHYS/QHP 5/>YRS: CPT

## 2021-08-02 PROCEDURE — C1887: CPT

## 2021-08-02 PROCEDURE — 82962 GLUCOSE BLOOD TEST: CPT

## 2021-08-02 PROCEDURE — 93005 ELECTROCARDIOGRAM TRACING: CPT

## 2021-08-02 PROCEDURE — C1769: CPT

## 2021-08-02 PROCEDURE — 93010 ELECTROCARDIOGRAM REPORT: CPT

## 2021-08-02 PROCEDURE — 80048 BASIC METABOLIC PNL TOTAL CA: CPT

## 2021-08-02 PROCEDURE — 85027 COMPLETE CBC AUTOMATED: CPT

## 2021-08-02 PROCEDURE — 93458 L HRT ARTERY/VENTRICLE ANGIO: CPT

## 2021-08-02 PROCEDURE — C1894: CPT

## 2021-08-02 PROCEDURE — 93458 L HRT ARTERY/VENTRICLE ANGIO: CPT | Mod: 26

## 2021-08-02 RX ORDER — METFORMIN HYDROCHLORIDE 850 MG/1
1 TABLET ORAL
Qty: 0 | Refills: 0 | DISCHARGE

## 2021-08-02 RX ORDER — METHOTREXATE 2.5 MG/1
60 TABLET ORAL
Qty: 0 | Refills: 0 | DISCHARGE

## 2021-08-02 RX ORDER — METOPROLOL TARTRATE 50 MG
0 TABLET ORAL
Qty: 0 | Refills: 0 | DISCHARGE

## 2021-08-02 RX ORDER — LABETALOL HCL 100 MG
10 TABLET ORAL ONCE
Refills: 0 | Status: DISCONTINUED | OUTPATIENT
Start: 2021-08-02 | End: 2021-08-16

## 2021-08-02 NOTE — H&P CARDIOLOGY - HISTORY OF PRESENT ILLNESS
71 yo  Female with PMHx DMT2 (managed by Dr Guevara PMD, Hgba1c 9.6), HTN, HLD, status post bariatric surgery, gastric sleeve in 2018, presents with at least a one-month history of left sided chest pain. The pain is best described as dull in nature but sometimes can be sharp and not associated with any particular activity. She is sitting at home in the evening and feel is pain below the left clavicle area. Pt underwent Nuclear Stress Test on which was Abnormal. Pt was referred for Cardiac Cath by Dr Paresh Yan.       .       69 yo  Female with PMHx DMT2 (managed by Dr Guevara PMD, Hgba1c 9.6), HTN, HLD, status post bariatric surgery, gastric sleeve in 2018, presents with at least a one-month history of left sided chest pain. The pain is best described as dull in nature but sometimes can be sharp and not associated with any particular activity. She is sitting at home in the evening and feel is pain below the left clavicle area. Pt underwent Nuclear Stress Test on 7/19/21 which revealed medium sized, mild to moderate defects in anterior and anteroseptal walls that are partially reversible consistent with [place]-infarct ischemia, LVEF 70%. Pt was referred for Cardiac Cath by Dr Paresh Yan.       .

## 2021-08-02 NOTE — ASU DISCHARGE PLAN (ADULT/PEDIATRIC) - ASU DC SPECIAL INSTRUCTIONSFT

## 2021-08-02 NOTE — H&P CARDIOLOGY - PMH
Degenerative disc disease, lumbar    Diabetes Mellitus II (ICD9 250.00)  new onset 1 month ago, diet control  last fingerstick 107  HTN (Hypertension) (ICD9 401.9)    Hyperlipemia    Morbid Obesity (ICD9 278.01)    Psoriasis (ICD9 696.1)  soritane   20 mg

## 2021-08-02 NOTE — ASU DISCHARGE PLAN (ADULT/PEDIATRIC) - CARE PROVIDER_API CALL
Yuriy Yoder (MD)  Cardiology; Cardiovascular Disease; Internal Medicine  [address] [facility] [address]  [place] [address]  Phone: [phone]  Fax: [phone]  Established Patient  Follow Up Time: 2 weeks

## 2021-08-02 NOTE — H&P CARDIOLOGY - PSH
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

## 2021-08-03 ENCOUNTER — LABORATORY RESULT (OUTPATIENT)
Age: 71
End: 2021-08-03

## 2021-08-03 ENCOUNTER — APPOINTMENT (OUTPATIENT)
Dept: CARDIOLOGY | Facility: CLINIC | Age: 71
End: 2021-08-03
Payer: MEDICARE

## 2021-08-03 ENCOUNTER — NON-APPOINTMENT (OUTPATIENT)
Age: 71
End: 2021-08-03

## 2021-08-03 VITALS
SYSTOLIC BLOOD PRESSURE: 155 MMHG | HEIGHT: 63 IN | TEMPERATURE: 97 F | HEART RATE: 73 BPM | WEIGHT: 220 LBS | DIASTOLIC BLOOD PRESSURE: 80 MMHG | BODY MASS INDEX: 38.98 KG/M2 | RESPIRATION RATE: 16 BRPM | OXYGEN SATURATION: 95 %

## 2021-08-03 PROCEDURE — 99214 OFFICE O/P EST MOD 30 MIN: CPT

## 2021-08-03 PROCEDURE — 93000 ELECTROCARDIOGRAM COMPLETE: CPT

## 2021-08-03 NOTE — CARDIOLOGY SUMMARY
[de-identified] : 8/3/2021 [de-identified] : 7/16/2021 [de-identified] : 7/15/2021 [de-identified] : GREY: 7/15/2021\par Carotid: 7/14/2021

## 2021-08-03 NOTE — REASON FOR VISIT
[CV Risk Factors and Non-Cardiac Disease] : CV risk factors and non-cardiac disease [Hyperlipidemia] : hyperlipidemia [Hypertension] : hypertension [Other: ____] : [unfilled] [Coronary Artery Disease] : coronary artery disease [FreeTextEntry3] : DR ARENAS  [FreeTextEntry1] : This is a 70 year year old female here today for follow up cardiac evaluation. \par She has a past medical history significant for  non-insulin-dependent diabetes mellitus, hypertension, hyperlipidemia, status post bariatric surgery, gastric sleeve in 2018, status post spinal fusion surgery, status post cholecystectomy, nephrolithiasis, status post cataract surgery.\par \par CHIEF COMPLAINT:\par Today she is feeling generally well and does not have any complaints at this time. She states that she is here to follow up after her cardiac catheterization which was performed yesterday which demonstrated mild CAD. She states that she had elevated BP during her procedure and had to get Labetalol via IV. Her home BP reading this morning was 160's systolic. \par \par She denies fever, chills, weight loss, malaise, rash, alteration bowel habits, weakness, abdominal  pain, bloating, changes in urination, visual disturbances, chest pain, headaches, dizziness, heart palpitations, recent episodes of syncope or falls at this time.\par \par

## 2021-08-03 NOTE — DISCUSSION/SUMMARY
[FreeTextEntry1] : Dr. Yoder-(PRIOR VISIT and PMH WITH Dr. Yoder): \par This is a 70-year-old female past medical history significant for non-insulin-dependent diabetes mellitus, hypertension, hyperlipidemia, status post bariatric surgery, gastric sleeve in 2018, status post spinal fusion surgery, status post cholecystectomy, nephrolithiasis, status post cataract surgery, who comes in for cardiac consultation.  \par \par She has no history of rheumatic fever and does not drink excessive caffeine or alcohol.\par \par The patient has been having at least a one-month history of left sided chest pain.  The pain is best described as dull in nature but sometimes can be sharp and not associated with any particular activity.  She is sitting at home in the evening and feel is pain below the left clavicle area.\par \par Her cardiac risk factors include hypertension, hyperlipidemia, psoriasis, history of smoking approximately 2 packs per week, she stopped 20 years ago), and diabetes mellitus.\par The patient had a history of non-insulin-dependent diabetes mellitus, which improved after bariatric gastric sleeve surgery.\par \par EKG done June 17, 2021 demonstrated normal sinus rhythm rate 74 beats per minutes otherwise remarkable for T wave inversion in V1 and V2.\par \par The patient will schedule a pharmacologic nuclear stress test rule out significant coronary artery disease.  The patient is unable to walk on a treadmill secondary to her chronic low back pain and spinal fusion.  She does walk with the assistance of a cane.\par \par She will schedule echo Doppler examination to evaluate her left ventricular function, murmur, rule out hypertrophy.\par \par 2 we will also schedule a carotid Doppler examination if she may have a left carotid bruit versus transmitted murmur.\par \par She will get me a copy of the most recent blood work from Dr. Flemnig.\par She will followup with me at the above noted diagnostic tests are completed.\par \par The patient understands that aerobic exercises must be increased to 40 minutes 4 times per week. A detailed discussion of lifestyle modification was done today. The patient has a good understanding of the diagnosis, and treatment plan. Lifestyle modification was also outlined.

## 2021-08-03 NOTE — ASSESSMENT
[FreeTextEntry1] : This is a 70 year year old female here today for follow up cardiac evaluation. \par She has a past medical history significant for  non-insulin-dependent diabetes mellitus, hypertension, hyperlipidemia, status post bariatric surgery, gastric sleeve in 2018, status post spinal fusion surgery, status post cholecystectomy, nephrolithiasis, status post cataract surgery.\par \par CHIEF COMPLAINT:\par Today she is feeling generally well and does not have any complaints at this time. She states that she is here to follow up after her cardiac catheterization which was performed yesterday which demonstrated mild CAD. She states that she had elevated BP during her procedure and had to get Labetalol via IV. Her home BP reading this morning was 160's systolic. \par \par -Her cardiac risk factors include hypertension, hyperlipidemia, psoriasis, history of smoking approximately 2 packs per week, she stopped 20 years ago), and diabetes mellitus. The patient had a history of non-insulin-dependent diabetes mellitus, which improved after bariatric gastric sleeve surgery. She has no history of rheumatic fever and does not drink excessive caffeine or alcohol.\par \par BLOOD PRESSURE:\par -BP is elevated on today's exam and she is only on Metoprolol ER 25mg PO DAILY. We will ad Telmisartan HCTZ 80/12.5mg PO DAILY and she will follow up in 1 month for BP check and evaluation. She has a documented history of BP as high as 190's systolic.\par \par -I have discussed the importance of maintaining good BP control and reviewed the newest guidelines with the patient while re-enforcing dietary sodium restrictions to no more than 2-3 g daily, DASH diet, life style modifications as well as the goal of maintaining ideal body weight with the patient today. I have advised the patient to avoid the use of over-the-counter medications/ supplements especially NSAIDS.\par \par I have reviewed with Ms. PEACE that serious health consequences can occur when blood pressure is not well controlled and the need for strict compliance with medication and that optimal control can significantly reduce the risk of cardiovascular disease stroke, heart attack and other organ damage. They verbally expressed understanding of the fore mentioned serious health consequences to me today.\par \par BLOOD WORK:\par -New blood work was done today, 08/03/2021 to evaluate lipid profile, CBC, BMP, hepatic function, A1C and TSH. Pt currently taking Atorvastatin 10mg PO DAILY. We will increase this to 20mg PO DAILY since her cath demonstrated evidence for mild disease and I explained that LDL goal should be 50-70 or less. She is also diabetic. \par \par CHOLESTEROL CONTROL:\par -Patient will continue the advised  TLC diet and to continue follow-up for treatment of hyperlipidemia and repeat blood testing with diet and exercise. I have discussed different exercises and the importance of maintenance of optimal body weight. The importance of staying within guidelines and recommendations was stressed to the patient today and they acknowledged that they understand this to me verbally.\par \par  -Ms. PEACE was educated and advised that failure to follow-up with my medical recommendations to lower cholesterol can result in severe health consequences therefore, they will continuing a low saturated and low fat diet and to avoid excessive carbohydrates to help reduce triglycerides and that lowering LDL levels is associated with a significant decrease in serious cardiac events including but not limited to heart attack stroke and overall death. We will continue lipid lowering agents as advised based on blood test results and the patient understands to call if they develop severe muscle discomfort or if they have a reddish tinted discoloration in their urine.\par \par DIABETIC CONTROL:\par The patient's blood glucose consistent with their diabetes. A1C is reported to be 9.3%.\par I will advise the patient to keep try to stay on a low carbohydrate diet lose weight and exercise to reduce spikes in their blood sugar.  The importance of monitoring blood sugar regularly and HgBA1c level were reviewed. I have also discussed annual eye exams to prevent blindness,  monitoring urine microalbumin regularly to check for kidney damage and the importance of proper foot care and regularly checking feet to prevent sores and possibly loss of limbs was reviewed. \par \par TESTING/REPORTS:\par -EKG done Aug 03, 2021 which demonstrated regular sinus rhythm with nonspecific ST-T wave changes, BPM of 59 \par \par -The patient had an echocardiogram done on 7/15/2021 demonstrated mild mitral, aortic and tricuspid regurgitation, mild left atrial enlargement with normal left ventricular systolic function. \par -EF on echo reported to be 65%\par \par -Pharm Nuc stress test done on 7/19/2021 demonstrated evidence for medium sized, mild to moderate defects in the anterior and anteroseptal walls that are partially reversible consistent with infarction with moderate [place]-infarct ischemia.\par \par -She proceeded to have a left heart cardiac cath done on 8/2/2021 which demonstrated evidence for mild CAD. \par \par PLAN:\par -Increase Atorvastatin from 10 to 20mg PO DAILY.\par -Start Telmisartan HCTZ 80/12.5mg PO DAILY.\par -Follow up in 1 month for BP check.\par \par I have discussed the plan of care with Ms. DAVID PEACE. She is compliant with all of her medications.\par \par The patient understands that aerobic exercises must be increased to minutes 4 times/week and a detailed discussion of lifestyle modification was done today. \par The patient has a good understanding of the diagnosis, treatment plan and lifestyle modification. \par She will contact me at the office for any questions with their care or any changes in their health status.\par \par The patient was seen together with supervision physician Dr. Yuriy Yoder and the plan of care was discussed with the patient and will be carried out as noted above.\par \par Christian RICHARDS

## 2021-08-04 ENCOUNTER — NON-APPOINTMENT (OUTPATIENT)
Age: 71
End: 2021-08-04

## 2021-08-30 PROBLEM — R09.89 CAROTID ARTERY BRUIT: Status: ACTIVE | Noted: 2021-06-17

## 2021-09-10 ENCOUNTER — APPOINTMENT (OUTPATIENT)
Dept: CARDIOLOGY | Facility: CLINIC | Age: 71
End: 2021-09-10
Payer: MEDICARE

## 2021-09-10 ENCOUNTER — NON-APPOINTMENT (OUTPATIENT)
Age: 71
End: 2021-09-10

## 2021-09-10 VITALS
WEIGHT: 220 LBS | BODY MASS INDEX: 38.98 KG/M2 | TEMPERATURE: 98 F | DIASTOLIC BLOOD PRESSURE: 80 MMHG | OXYGEN SATURATION: 94 % | HEART RATE: 94 BPM | SYSTOLIC BLOOD PRESSURE: 130 MMHG | RESPIRATION RATE: 16 BRPM | HEIGHT: 63 IN

## 2021-09-10 PROCEDURE — 99214 OFFICE O/P EST MOD 30 MIN: CPT | Mod: 25

## 2021-09-10 PROCEDURE — 93000 ELECTROCARDIOGRAM COMPLETE: CPT | Mod: NC

## 2021-09-10 NOTE — CARDIOLOGY SUMMARY
[de-identified] : 9/10/2021 [de-identified] : 7/16/2021 [de-identified] : 7/15/2021 [de-identified] : GREY: 7/15/2021\par Carotid: 7/14/2021

## 2021-09-10 NOTE — DISCUSSION/SUMMARY
[FreeTextEntry1] : Dr. Yoder-(PRIOR VISIT and PMH WITH Dr. Yoder): \par This is a 70-year-old female past medical history significant for non-insulin-dependent diabetes mellitus, hypertension, hyperlipidemia, status post bariatric surgery, gastric sleeve in 2018, status post spinal fusion surgery, status post cholecystectomy, nephrolithiasis, status post cataract surgery, who comes in for cardiac consultation.  \par \par She has no history of rheumatic fever and does not drink excessive caffeine or alcohol.\par \par The patient has been having at least a one-month history of left sided chest pain.  The pain is best described as dull in nature but sometimes can be sharp and not associated with any particular activity.  She is sitting at home in the evening and feel is pain below the left clavicle area.\par \par Her cardiac risk factors include hypertension, hyperlipidemia, psoriasis, history of smoking approximately 2 packs per week, she stopped 20 years ago), and diabetes mellitus.\par The patient had a history of non-insulin-dependent diabetes mellitus, which improved after bariatric gastric sleeve surgery.\par \par EKG done June 17, 2021 demonstrated normal sinus rhythm rate 74 beats per minutes otherwise remarkable for T wave inversion in V1 and V2.\par \par The patient will schedule a pharmacologic nuclear stress test rule out significant coronary artery disease.  The patient is unable to walk on a treadmill secondary to her chronic low back pain and spinal fusion.  She does walk with the assistance of a cane.\par \par She will schedule echo Doppler examination to evaluate her left ventricular function, murmur, rule out hypertrophy.\par \par 2 we will also schedule a carotid Doppler examination if she may have a left carotid bruit versus transmitted murmur.\par \par She will get me a copy of the most recent blood work from Dr. Fleming.\par She will followup with me at the above noted diagnostic tests are completed.\par \par The patient understands that aerobic exercises must be increased to 40 minutes 4 times per week. A detailed discussion of lifestyle modification was done today. The patient has a good understanding of the diagnosis, and treatment plan. Lifestyle modification was also outlined.

## 2021-09-10 NOTE — REASON FOR VISIT
[CV Risk Factors and Non-Cardiac Disease] : CV risk factors and non-cardiac disease [Hyperlipidemia] : hyperlipidemia [Hypertension] : hypertension [Coronary Artery Disease] : coronary artery disease [Other: ____] : [unfilled] [FreeTextEntry3] : DR ARENAS  [FreeTextEntry1] : This is a 70 year year old female here today for follow up cardiac evaluation. \par She has a past medical history significant for  non-insulin-dependent diabetes mellitus, hypertension, hyperlipidemia, status post bariatric surgery, gastric sleeve in 2018, status post spinal fusion surgery, status post cholecystectomy, nephrolithiasis, status post cataract surgery.\par \par CHIEF COMPLAINT:\par Today she is feeling generally well and does not have any complaints at this time. She is here for BP check and also for cardiac clearance for spine surgery which is scheduled for 9/30/2021 by Dr. Joseph at [facility]. She would also like a copy sent to her PCP Dr. Freed.\par \par She denies fever, chills, weight loss, malaise, rash, alteration bowel habits, weakness, abdominal  pain, bloating, changes in urination, visual disturbances, chest pain, headaches, dizziness, heart palpitations, recent episodes of syncope or falls at this time.\par \par

## 2021-10-11 ENCOUNTER — APPOINTMENT (OUTPATIENT)
Dept: CARDIOLOGY | Facility: CLINIC | Age: 71
End: 2021-10-11

## 2021-11-01 ENCOUNTER — APPOINTMENT (OUTPATIENT)
Dept: CARDIOLOGY | Facility: CLINIC | Age: 71
End: 2021-11-01

## 2021-11-22 ENCOUNTER — LABORATORY RESULT (OUTPATIENT)
Age: 71
End: 2021-11-22

## 2021-11-22 ENCOUNTER — APPOINTMENT (OUTPATIENT)
Dept: CARDIOLOGY | Facility: CLINIC | Age: 71
End: 2021-11-22
Payer: MEDICARE

## 2021-11-22 VITALS
RESPIRATION RATE: 16 BRPM | DIASTOLIC BLOOD PRESSURE: 82 MMHG | HEART RATE: 75 BPM | TEMPERATURE: 98 F | SYSTOLIC BLOOD PRESSURE: 126 MMHG | WEIGHT: 220 LBS | BODY MASS INDEX: 38.98 KG/M2 | HEIGHT: 63 IN | OXYGEN SATURATION: 96 %

## 2021-11-22 PROCEDURE — 99214 OFFICE O/P EST MOD 30 MIN: CPT

## 2021-12-14 ENCOUNTER — RESULT REVIEW (OUTPATIENT)
Age: 71
End: 2021-12-14

## 2022-01-17 ENCOUNTER — TRANSCRIPTION ENCOUNTER (OUTPATIENT)
Age: 72
End: 2022-01-17

## 2022-03-15 NOTE — DISCUSSION/SUMMARY
[FreeTextEntry1] : This is a 71-year-old female past medical history significant for non-insulin-dependent diabetes mellitus, hypertension, hyperlipidemia, status post bariatric surgery, gastric sleeve in 2018, status post spinal fusion surgery, status post cholecystectomy, nephrolithiasis, status post cataract surgery, who comes in for cardiac follow-up evaluation.\par The patient had recent back surgery with a lateral approach and is currently wearing a surgical binder.\par She has been stable on her current dose of Lipitor 20 mg/day, Micardis hydrochlorothiazide 80/12.5 mg/day, Toprol-XL 25 mg/day, and Lipitor 20 mg/day.\par She will have new blood work done today for lipid panel SMA-20.  She will follow up with her orthopedic surgeon regarding her back.\par The patient had a cardiac catheterization done August second, 2021 after the patient had an abnormal nuclear stress test July 19, 2021.  The catheterization revealed a normal left main, left anterior descending artery, first diagonal artery, circumflex artery, obtuse marginal artery, and right coronary artery consistent with normal cardiac catheterization.  The nuclear stress test was considered false positive.\par She will continue on her current diet.  Her exercise is limited due to her recent back surgery.\par She will follow up with me in 4 months.  Hopefully with lifestyle modification, caloric reduction and increased aerobic activity a lipid panel will improve.\par Her cardiac risk factors include hypertension, hyperlipidemia, psoriasis, history of smoking approximately 2 packs per week, she stopped 20 years ago), and diabetes mellitus.\par The patient had a history of non-insulin-dependent diabetes mellitus, which improved after bariatric gastric sleeve surgery.\par \par EKG done June 17, 2021 demonstrated normal sinus rhythm rate 74 beats per minutes otherwise remarkable for T wave inversion in V1 and V2.\par \par The patient understands that aerobic exercises must be increased to 40 minutes 4 times per week. A detailed discussion of lifestyle modification was done today. The patient has a good understanding of the diagnosis, and treatment plan. Lifestyle modification was also outlined.

## 2022-03-15 NOTE — ASSESSMENT
[FreeTextEntry1] : Prior note nurse practitioner [facility]se[place] September 10, 2021::\par \par This is a 70 year year old female here today for follow up cardiac evaluation. \par She has a past medical history significant for  non-insulin-dependent diabetes mellitus, hypertension, hyperlipidemia, status post bariatric surgery, gastric sleeve in 2018, status post spinal fusion surgery, status post cholecystectomy, nephrolithiasis, status post cataract surgery.\par \par CHIEF COMPLAINT:\par Today she is feeling generally well and does not have any complaints at this time. She is here for BP check and also for cardiac clearance for spine surgery which is scheduled for 9/30/2021 by Dr. Joseph at [facility]. She would also like a copy sent to her PCP Dr. Freed.\par \par The patient is clear from a cardiac standpoint for her spinal surgery. Please avoid overhydration. The patient should be allowed to take their usual medications in the perioperative period. Maintain proper DVT prophylaxis. The patient should have an incentive spirometer in the perioperative period. \par \par -Her cardiac risk factors include hypertension, hyperlipidemia, psoriasis, history of smoking approximately 2 packs per week, she stopped 20 years ago), and diabetes mellitus. The patient had a history of non-insulin-dependent diabetes mellitus, which improved after bariatric gastric sleeve surgery. She has no history of rheumatic fever and does not drink excessive caffeine or alcohol.\par \par BLOOD PRESSURE:\par -BP is better controlled on today's exam and she is only on Metoprolol ER 25mg PO DAILY and on Telmisartan HCTZ 80/12.5mg PO DAILY.\par \par -I have discussed the importance of maintaining good BP control and reviewed the newest guidelines with the patient while re-enforcing dietary sodium restrictions to no more than 2-3 g daily, DASH diet, life style modifications as well as the goal of maintaining ideal body weight with the patient today. I have advised the patient to avoid the use of over-the-counter medications/ supplements especially NSAIDS.\par \par I have reviewed with Ms. PEACE that serious health consequences can occur when blood pressure is not well controlled and the need for strict compliance with medication and that optimal control can significantly reduce the risk of cardiovascular disease stroke, heart attack and other organ damage. They verbally expressed understanding of the fore mentioned serious health consequences to me today.\par \par BLOOD WORK:\par -New blood work was done 08/03/2021 which demonstrated cholesterol of 162, LDL 74. We had increased her Atorvastatin from 20mg to 40mg PO DAILY due to her CAD and DM history and I explained that LDL goal should be 50 or less.  \par \par CHOLESTEROL CONTROL:\par -Patient will continue the advised  TLC diet and to continue follow-up for treatment of hyperlipidemia and repeat blood testing with diet and exercise. I have discussed different exercises and the importance of maintenance of optimal body weight. The importance of staying within guidelines and recommendations was stressed to the patient today and they acknowledged that they understand this to me verbally.\par \par  -Ms. PEACE was educated and advised that failure to follow-up with my medical recommendations to lower cholesterol can result in severe health consequences therefore, they will continuing a low saturated and low fat diet and to avoid excessive carbohydrates to help reduce triglycerides and that lowering LDL levels is associated with a significant decrease in serious cardiac events including but not limited to heart attack stroke and overall death. We will continue lipid lowering agents as advised based on blood test results and the patient understands to call if they develop severe muscle discomfort or if they have a reddish tinted discoloration in their urine.\par \par DIABETIC CONTROL:\par The patient's blood glucose consistent with their diabetes. A1C is reported to be 6.4%.\par I will advise the patient to keep try to stay on a low carbohydrate diet lose weight and exercise to reduce spikes in their blood sugar.  The importance of monitoring blood sugar regularly and HgBA1c level were reviewed. I have also discussed annual eye exams to prevent blindness,  monitoring urine microalbumin regularly to check for kidney damage and the importance of proper foot care and regularly checking feet to prevent sores and possibly loss of limbs was reviewed. \par \par TESTING/REPORTS:\par -EKG done Sept 10,, 2021 which demonstrated regular sinus rhythm with nonspecific ST-T wave changes, BPM of 81.\par \par -The patient had an echocardiogram done on 7/15/2021 demonstrated mild mitral, aortic and tricuspid regurgitation, mild left atrial enlargement with normal left ventricular systolic function. \par -EF on echo reported to be 65%\par \par -Pharm Nuc stress test done on 7/19/2021 demonstrated evidence for medium sized, mild to moderate defects in the anterior and anteroseptal walls that are partially reversible consistent with infarction with moderate [place]ia-infarct ischemia.\par \par -She proceeded to have a left heart cardiac cath done on 8/2/2021 which demonstrated evidence for mild CAD. \par \par PLAN:\par -She will continue with her usual medications and will contact the office if she is having any complaints between now and their next follow up appointment.\par -She will follow up with her PCP.\par \par I have discussed the plan of care with Ms. DAVID PEACE. She is compliant with all of her medications.\par \par The patient understands that aerobic exercises must be increased to minutes 4 times/week and a detailed discussion of lifestyle modification was done today. \par The patient has a good understanding of the diagnosis, treatment plan and lifestyle modification. \par She will contact me at the office for any questions with their care or any changes in their health status.\par \par The patient was seen together with supervision physician Dr. Yuriy Yoder and the plan of care was discussed with the patient and will be carried out as noted above.\par \par Christian RICHARDS

## 2022-03-15 NOTE — REASON FOR VISIT
[CV Risk Factors and Non-Cardiac Disease] : CV risk factors and non-cardiac disease [Hyperlipidemia] : hyperlipidemia [Hypertension] : hypertension [Coronary Artery Disease] : coronary artery disease [Other: ____] : [unfilled] [FreeTextEntry3] : DR ARENAS  [FreeTextEntry1] : This is a 71 year year old female here today for follow up cardiac evaluation. \par She has a past medical history significant for  non-insulin-dependent diabetes mellitus, hypertension, hyperlipidemia, status post bariatric surgery, gastric sleeve in 2018, status post spinal fusion surgery, status post cholecystectomy, nephrolithiasis, status post cataract surgery.\par She denies fever, chills, weight loss, malaise, rash, alteration bowel habits, weakness, abdominal  pain, bloating, changes in urination, visual disturbances, chest pain, headaches, dizziness, heart palpitations, recent episodes of syncope or falls at this time.\par \par

## 2022-03-15 NOTE — CARDIOLOGY SUMMARY
[de-identified] : 9/10/2021 [de-identified] : 7/16/2021 [de-identified] : 7/15/2021 [de-identified] : GREY: 7/15/2021\par Carotid: 7/14/2021

## 2022-04-22 ENCOUNTER — LABORATORY RESULT (OUTPATIENT)
Age: 72
End: 2022-04-22

## 2022-04-22 ENCOUNTER — APPOINTMENT (OUTPATIENT)
Dept: CARDIOLOGY | Facility: CLINIC | Age: 72
End: 2022-04-22
Payer: MEDICARE

## 2022-04-22 ENCOUNTER — NON-APPOINTMENT (OUTPATIENT)
Age: 72
End: 2022-04-22

## 2022-04-22 VITALS
HEIGHT: 63 IN | SYSTOLIC BLOOD PRESSURE: 115 MMHG | WEIGHT: 204 LBS | HEART RATE: 80 BPM | TEMPERATURE: 98.2 F | DIASTOLIC BLOOD PRESSURE: 70 MMHG | OXYGEN SATURATION: 98 % | BODY MASS INDEX: 36.14 KG/M2 | RESPIRATION RATE: 16 BRPM

## 2022-04-22 DIAGNOSIS — Z87.898 PERSONAL HISTORY OF OTHER SPECIFIED CONDITIONS: ICD-10-CM

## 2022-04-22 DIAGNOSIS — Z01.810 ENCOUNTER FOR PREPROCEDURAL CARDIOVASCULAR EXAMINATION: ICD-10-CM

## 2022-04-22 PROCEDURE — 93000 ELECTROCARDIOGRAM COMPLETE: CPT | Mod: NC

## 2022-04-22 PROCEDURE — 99214 OFFICE O/P EST MOD 30 MIN: CPT | Mod: 25

## 2022-04-22 RX ORDER — METFORMIN HYDROCHLORIDE 500 MG/1
500 TABLET, COATED ORAL
Refills: 0 | Status: COMPLETED | COMMUNITY
End: 2022-04-22

## 2022-04-22 NOTE — CARDIOLOGY SUMMARY
[de-identified] : 4/22/22 [de-identified] : 7/16/2021 [de-identified] : 7/15/2021 [de-identified] : GREY: 7/15/2021\par Carotid: 7/14/2021

## 2022-04-22 NOTE — REASON FOR VISIT
[CV Risk Factors and Non-Cardiac Disease] : CV risk factors and non-cardiac disease [Hyperlipidemia] : hyperlipidemia [Hypertension] : hypertension [Coronary Artery Disease] : coronary artery disease [Other: ____] : [unfilled] [FreeTextEntry3] : DR ARENAS  [FreeTextEntry1] : This is a 71 year year old female here today for follow up cardiac evaluation. \par She has a past medical history significant for  non-insulin-dependent diabetes mellitus, hypertension, hyperlipidemia, status post bariatric surgery, gastric sleeve in 2018, status post spinal fusion surgery, status post cholecystectomy, nephrolithiasis, status post cataract surgery and s/p spine surgery done on 9/30/2021 by Dr. Joseph at [place].\par \par CHIEF COMPLAINT:\par Today she is feeling generally well and does not have any complaints at this time. She is here for cardiac clearance for Ureteroscopy with biopsy of left ureter to be done by Dr. Burgess at [facility]  on May 2nd 2022. \par \par She remains on Atorvastatin 20mg PO DAILY, Telmisartan HCTZ 80/12.5mg PO DAILY and on Metoprolol ER  25mg PO DAILY. \par \par She denies fever, chills, weight loss, malaise, rash, alteration bowel habits, weakness, abdominal  pain, bloating, changes in urination, visual disturbances, chest pain, headaches, dizziness, heart palpitations, recent episodes of syncope or falls at this time.\par \par

## 2022-04-22 NOTE — DISCUSSION/SUMMARY
[FreeTextEntry1] : Dr. Yoder-(PRIOR VISIT and PMH WITH Dr. Yoder): \par This is a 71-year-old female past medical history significant for non-insulin-dependent diabetes mellitus, hypertension, hyperlipidemia, status post bariatric surgery, gastric sleeve in 2018, status post spinal fusion surgery, status post cholecystectomy, nephrolithiasis, status post cataract surgery, who comes in for cardiac follow-up evaluation.\par The patient had recent back surgery with a lateral approach and is currently wearing a surgical binder.\par \par She has been stable on her current dose of Lipitor 20 mg/day, Micardis hydrochlorothiazide 80/12.5 mg/day, Toprol-XL 25 mg/day, and Lipitor 20 mg/day.\par She will have new blood work done today for lipid panel SMA-20.  She will follow up with her orthopedic surgeon regarding her back.\par \par The patient had a cardiac catheterization done August second, 2021 after the patient had an abnormal nuclear stress test July 19, 2021.  The catheterization revealed a normal left main, left anterior descending artery, first diagonal artery, circumflex artery, obtuse marginal artery, and right coronary artery consistent with normal cardiac catheterization.  The nuclear stress test was considered false positive.\par \par She will continue on her current diet.  Her exercise is limited due to her recent back surgery.\par She will follow up with me in 4 months.  Hopefully with lifestyle modification, caloric reduction and increased aerobic activity a lipid panel will improve.\par \par Her cardiac risk factors include hypertension, hyperlipidemia, psoriasis, history of smoking approximately 2 packs per week, she stopped 20 years ago), and diabetes mellitus.\par The patient had a history of non-insulin-dependent diabetes mellitus, which improved after bariatric gastric sleeve surgery.\par \par EKG done June 17, 2021 demonstrated normal sinus rhythm rate 74 beats per minutes otherwise remarkable for T wave inversion in V1 and V2.\par \par The patient understands that aerobic exercises must be increased to 40 minutes 4 times per week. A detailed discussion of lifestyle modification was done today. The patient has a good understanding of the diagnosis, and treatment plan. Lifestyle modification was also outlined.

## 2022-06-23 ENCOUNTER — RESULT REVIEW (OUTPATIENT)
Age: 72
End: 2022-06-23

## 2022-08-09 ENCOUNTER — APPOINTMENT (OUTPATIENT)
Dept: NUCLEAR MEDICINE | Facility: HOSPITAL | Age: 72
End: 2022-08-09

## 2022-08-09 ENCOUNTER — OUTPATIENT (OUTPATIENT)
Dept: OUTPATIENT SERVICES | Facility: HOSPITAL | Age: 72
LOS: 1 days | End: 2022-08-09

## 2022-08-09 DIAGNOSIS — Z98.1 ARTHRODESIS STATUS: Chronic | ICD-10-CM

## 2022-08-09 DIAGNOSIS — Z90.3 ACQUIRED ABSENCE OF STOMACH [PART OF]: Chronic | ICD-10-CM

## 2022-08-09 DIAGNOSIS — Z98.84 BARIATRIC SURGERY STATUS: Chronic | ICD-10-CM

## 2022-08-09 DIAGNOSIS — H26.9 UNSPECIFIED CATARACT: Chronic | ICD-10-CM

## 2022-08-09 DIAGNOSIS — N13.5 CROSSING VESSEL AND STRICTURE OF URETER WITHOUT HYDRONEPHROSIS: ICD-10-CM

## 2022-08-09 PROCEDURE — 78708 K FLOW/FUNCT IMAGE W/DRUG: CPT | Mod: 26

## 2022-08-09 PROCEDURE — 51702 INSERT TEMP BLADDER CATH: CPT

## 2022-10-12 ENCOUNTER — APPOINTMENT (OUTPATIENT)
Dept: SPINE | Facility: CLINIC | Age: 72
End: 2022-10-12

## 2022-10-13 ENCOUNTER — NON-APPOINTMENT (OUTPATIENT)
Age: 72
End: 2022-10-13

## 2022-10-14 ENCOUNTER — APPOINTMENT (OUTPATIENT)
Dept: CARDIOLOGY | Facility: CLINIC | Age: 72
End: 2022-10-14

## 2022-10-14 ENCOUNTER — NON-APPOINTMENT (OUTPATIENT)
Age: 72
End: 2022-10-14

## 2022-10-14 VITALS
HEIGHT: 63 IN | TEMPERATURE: 98.2 F | OXYGEN SATURATION: 96 % | DIASTOLIC BLOOD PRESSURE: 80 MMHG | HEART RATE: 71 BPM | WEIGHT: 200 LBS | SYSTOLIC BLOOD PRESSURE: 125 MMHG | RESPIRATION RATE: 16 BRPM | BODY MASS INDEX: 35.44 KG/M2

## 2022-10-14 PROCEDURE — 99214 OFFICE O/P EST MOD 30 MIN: CPT | Mod: 25

## 2022-10-14 PROCEDURE — 93000 ELECTROCARDIOGRAM COMPLETE: CPT

## 2022-10-14 NOTE — REASON FOR VISIT
[CV Risk Factors and Non-Cardiac Disease] : CV risk factors and non-cardiac disease [Hyperlipidemia] : hyperlipidemia [Hypertension] : hypertension [Coronary Artery Disease] : coronary artery disease [Other: ____] : [unfilled] [FreeTextEntry3] : DR ARENAS  [FreeTextEntry1] : This is a 71 year year old female here today for follow up cardiac evaluation. \par She has a past medical history significant for  non-insulin-dependent diabetes mellitus, hypertension, hyperlipidemia, status post bariatric surgery, gastric sleeve in 2018, status post spinal fusion surgery, status post cholecystectomy, nephrolithiasis, status post cataract surgery and s/p spine surgery done on 9/30/2021 by Dr. Joseph at [place].\par \par CHIEF COMPLAINT:\par Today she is feeling generally well and does not have any complaints at this time. \par \par She remains on Atorvastatin 20mg PO DAILY, Telmisartan HCTZ 80/12.5mg PO DAILY and on Metoprolol ER  25mg PO DAILY. \par \par She denies fever, chills, weight loss, malaise, rash, alteration bowel habits, weakness, abdominal  pain, bloating, changes in urination, visual disturbances, chest pain, headaches, dizziness, heart palpitations, recent episodes of syncope or falls at this time.\par \par

## 2022-10-14 NOTE — PHYSICAL EXAM
[Well Developed] : well developed [Well Nourished] : well nourished [No Acute Distress] : no acute distress [Normal Conjunctiva] : normal conjunctiva [Normal Venous Pressure] : normal venous pressure [No Carotid Bruit] : no carotid bruit [No Rub] : no rub [5th Left ICS - MCL] : palpated at the 5th LICS in the midclavicular line [Normal] : normal [No Precordial Heave] : no precordial heave was noted [Normal Rate] : normal [Rhythm Regular] : regular [Normal S1] : normal S1 [Normal S2] : normal S2 [No Gallop] : no gallop heard [II] : a grade 2 [No Pitting Edema] : no pitting edema present [2+] : left 2+ [Clear Lung Fields] : clear lung fields [Good Air Entry] : good air entry [No Respiratory Distress] : no respiratory distress  [Soft] : abdomen soft [Non Tender] : non-tender [No Masses/organomegaly] : no masses/organomegaly [Normal Bowel Sounds] : normal bowel sounds [Normal Gait] : normal gait [No Edema] : no edema [No Cyanosis] : no cyanosis [No Clubbing] : no clubbing [No Varicosities] : no varicosities [No Rash] : no rash [No Skin Lesions] : no skin lesions [Moves all extremities] : moves all extremities [No Focal Deficits] : no focal deficits [Normal Speech] : normal speech [Alert and Oriented] : alert and oriented [Normal memory] : normal memory

## 2022-10-14 NOTE — ASSESSMENT
[FreeTextEntry1] : This is a 71 year year old female here today for follow up cardiac evaluation. \par She has a past medical history significant for  non-insulin-dependent diabetes mellitus, hypertension, hyperlipidemia, status post bariatric surgery, gastric sleeve in 2018, status post spinal fusion surgery, status post cholecystectomy, nephrolithiasis, status post cataract surgery and s/p spine surgery done on 9/30/2021 by Dr. Joseph at [place].\par \par CHIEF COMPLAINT:\par Today she is feeling generally well and does not have any complaints at this time. \par She remains on Atorvastatin 20mg PO DAILY, Telmisartan HCTZ 80/12.5mg PO DAILY and on Metoprolol ER  25mg PO DAILY. \par \par *She states that her mother-in-law recently passed earlier this week.\par \par She is s/p Ureteroscopy with biopsy of left ureter to be done by Dr. Burgess at [facility]  on May 2nd 2022. \par \par -Her cardiac risk factors include hypertension, hyperlipidemia, psoriasis, history of smoking approximately 2 packs per week, she stopped 20 years ago), and diabetes mellitus. The patient had a history of non-insulin-dependent diabetes mellitus, which improved after bariatric gastric sleeve surgery. She has no history of rheumatic fever and does not drink excessive caffeine or alcohol.\par \par BLOOD PRESSURE:\par -BP is controlled on today's exam.\par \par -I have discussed the importance of maintaining good BP control and reviewed the newest guidelines with the patient while re-enforcing dietary sodium restrictions to no more than 2-3 g daily, DASH diet, life style modifications as well as the goal of maintaining ideal body weight with the patient today. I have advised the patient to avoid the use of over-the-counter medications/ supplements especially NSAIDS.\par \par I have reviewed with Ms. PEACE that serious health consequences can occur when blood pressure is not well controlled and the need for strict compliance with medication and that optimal control can significantly reduce the risk of cardiovascular disease stroke, heart attack and other organ damage. They verbally expressed understanding of the fore mentioned serious health consequences to me today.\par \par BLOOD WORK:\par -Blood work was done by her PCP and a copy will be faxed to our office for our review. \par -Blood work done on 11/22/21 demonstrated Cholesterol of 134 and LDL of 57.\par -New blood work was done today, 04/22/2022  to evaluate lipid profile, CBC, BMP, hepatic function, A1C and TSH. \par -Blood work script given to the patient to have blood work done prior to her next follow up appointment. \par \par CHOLESTEROL CONTROL:\par -Patient will continue the advised  TLC diet and to continue follow-up for treatment of hyperlipidemia and repeat blood testing with diet and exercise. I have discussed different exercises and the importance of maintenance of optimal body weight. The importance of staying within guidelines and recommendations was stressed to the patient today and they acknowledged that they understand this to me verbally.\par \par  -Ms. PEACE was educated and advised that failure to follow-up with my medical recommendations to lower cholesterol can result in severe health consequences therefore, they will continuing a low saturated and low fat diet and to avoid excessive carbohydrates to help reduce triglycerides and that lowering LDL levels is associated with a significant decrease in serious cardiac events including but not limited to heart attack stroke and overall death. We will continue lipid lowering agents as advised based on blood test results and the patient understands to call if they develop severe muscle discomfort or if they have a reddish tinted discoloration in their urine.\par \par DIABETIC CONTROL:\par I will advise the patient to keep try to stay on a low carbohydrate diet lose weight and exercise to reduce spikes in their blood sugar.  The importance of monitoring blood sugar regularly and HgBA1c level were reviewed. I have also discussed annual eye exams to prevent blindness,  monitoring urine microalbumin regularly to check for kidney damage and the importance of proper foot care and regularly checking feet to prevent sores and possibly loss of limbs was reviewed. \par \par TESTING/REPORTS:\par -EKG done 10/14/2022 which demonstrated regular sinus rhythm with nonspecific ST-T wave changes BPM of 71.\par \par -EKG done 04/22/2022 which demonstrated regular sinus rhythm with nonspecific ST-T wave changes BPM of 80.\par \par -EKG done Sept 10, 2021 which demonstrated regular sinus rhythm with nonspecific ST-T wave changes, BPM of 81.\par \par -EKG done June 17, 2021 demonstrated normal sinus rhythm rate 74 beats per minutes otherwise remarkable for T wave inversion in V1 and V2.\par \par -The patient had an echocardiogram done on 7/15/2021 demonstrated mild mitral, aortic and tricuspid regurgitation, mild left atrial enlargement with normal left ventricular systolic function. \par -EF on echo reported to be 65%\par \par -Pharm Nuc stress test done on 7/19/2021 demonstrated evidence for medium sized, mild to moderate defects in the anterior and anteroseptal walls that are partially reversible consistent with infarction with moderate [place]-infarct ischemia.\par \par -The patient had a cardiac catheterization done August second, 2021 after the patient had an abnormal nuclear stress test July 19, 2021.  The catheterization revealed a normal left main, left anterior descending artery, first diagonal artery, circumflex artery, obtuse marginal artery, and right coronary artery consistent with normal cardiac catheterization.  The nuclear stress test was considered false positive.\par \par PLAN:\par -She will continue with her usual medications and will contact the office if she is having any complaints between now and their next follow up appointment.\par -The patient will schedule an Echo Doppler examination to evaluate murmur, left ventricular function, chamber size, and rule out hypertrophy. \par \par I have discussed the plan of care with Ms. DAVID PEACE. She is compliant with all of her medications.\par \par The patient understands that aerobic exercises must be increased to minutes 4 times/week and a detailed discussion of lifestyle modification was done today. \par The patient has a good understanding of the diagnosis, treatment plan and lifestyle modification. \par She will contact me at the office for any questions with their care or any changes in their health status.\par \par The patient was seen together with supervision physician Dr. Yuriy Yoder and the plan of care was discussed with the patient and will be carried out as noted above.\par \par Christian RICHARDS

## 2022-10-14 NOTE — CARDIOLOGY SUMMARY
[de-identified] : 4/22/22 [de-identified] : 7/16/2021 [de-identified] : 7/15/2021 [de-identified] : GREY: 7/15/2021\par Carotid: 7/14/2021

## 2022-10-17 RX ORDER — GABAPENTIN 600 MG/1
600 TABLET, COATED ORAL
Refills: 0 | Status: COMPLETED | COMMUNITY
End: 2022-10-17

## 2022-11-04 ENCOUNTER — NON-APPOINTMENT (OUTPATIENT)
Age: 72
End: 2022-11-04

## 2022-11-09 ENCOUNTER — APPOINTMENT (OUTPATIENT)
Dept: SPINE | Facility: CLINIC | Age: 72
End: 2022-11-09

## 2022-11-09 VITALS
SYSTOLIC BLOOD PRESSURE: 167 MMHG | HEIGHT: 64 IN | HEART RATE: 76 BPM | OXYGEN SATURATION: 96 % | DIASTOLIC BLOOD PRESSURE: 81 MMHG | BODY MASS INDEX: 34.83 KG/M2 | WEIGHT: 204 LBS

## 2022-11-09 DIAGNOSIS — G89.29 LOW BACK PAIN, UNSPECIFIED: ICD-10-CM

## 2022-11-09 DIAGNOSIS — M54.50 LOW BACK PAIN, UNSPECIFIED: ICD-10-CM

## 2022-11-09 DIAGNOSIS — M51.36 OTHER INTERVERTEBRAL DISC DEGENERATION, LUMBAR REGION: ICD-10-CM

## 2022-11-09 PROCEDURE — 99204 OFFICE O/P NEW MOD 45 MIN: CPT

## 2022-11-09 RX ORDER — MULTIVIT-MIN/FOLIC/VIT K/LYCOP 400-300MCG
25 MCG TABLET ORAL
Refills: 0 | Status: ACTIVE | COMMUNITY

## 2022-11-09 RX ORDER — ACETAMINOPHEN 325 MG
5000 TABLET ORAL
Refills: 0 | Status: ACTIVE | COMMUNITY

## 2022-11-10 ENCOUNTER — TRANSCRIPTION ENCOUNTER (OUTPATIENT)
Age: 72
End: 2022-11-10

## 2022-12-30 ENCOUNTER — APPOINTMENT (OUTPATIENT)
Dept: CARDIOLOGY | Facility: CLINIC | Age: 72
End: 2022-12-30
Payer: MEDICARE

## 2022-12-30 DIAGNOSIS — R06.00 DYSPNEA, UNSPECIFIED: ICD-10-CM

## 2022-12-30 PROCEDURE — 93306 TTE W/DOPPLER COMPLETE: CPT

## 2023-01-14 PROBLEM — R06.00 DOE (DYSPNEA ON EXERTION): Status: ACTIVE | Noted: 2021-06-16

## 2023-02-16 ENCOUNTER — APPOINTMENT (OUTPATIENT)
Dept: ORTHOPEDIC SURGERY | Facility: CLINIC | Age: 73
End: 2023-02-16
Payer: MEDICARE

## 2023-02-16 VITALS
WEIGHT: 210 LBS | TEMPERATURE: 97.7 F | SYSTOLIC BLOOD PRESSURE: 134 MMHG | HEIGHT: 64 IN | BODY MASS INDEX: 35.85 KG/M2 | DIASTOLIC BLOOD PRESSURE: 72 MMHG | HEART RATE: 80 BPM

## 2023-02-16 DIAGNOSIS — M79.601 PAIN IN RIGHT ARM: ICD-10-CM

## 2023-02-16 DIAGNOSIS — M54.12 RADICULOPATHY, CERVICAL REGION: ICD-10-CM

## 2023-02-16 PROCEDURE — 99214 OFFICE O/P EST MOD 30 MIN: CPT

## 2023-02-16 PROCEDURE — 72040 X-RAY EXAM NECK SPINE 2-3 VW: CPT

## 2023-02-21 NOTE — REASON FOR VISIT
[Initial Visit] : an initial visit for [Family Member] : family member [FreeTextEntry2] : right arm pain

## 2023-02-21 NOTE — HISTORY OF PRESENT ILLNESS
[de-identified] : Patient is here for right arm pain that began about 3 months ago. There was no inciting injury. She feels the pain radiate down her arm. She has difficulty with grasping. She experiences numbness. She has tried OTC pain medication. \par \par The patient's past medical history, past surgical history, medications and allergies were reviewed by me today and documented accordingly. In addition, the patient's family and social history, which were noncontributory to this visit, were reviewed also. Intake form was reviewed. The patient has no family history of arthritis.

## 2023-02-21 NOTE — PHYSICAL EXAM
[de-identified] : Constitutional: Well-nourished, well-developed, No acute distress\par Respiratory:  Good respiratory effort, no SOB\par Lymphatic: No regional lymphadenopathy, no lymphedema\par Psychiatric: Pleasant and normal affect, alert and oriented x3\par Skin: Clean dry and intact B/L UE/LE\par Musculoskeletal: normal except where as noted in regional exam\par \par \par Cervical Spine Exam\par APPEARANCE: no marked deformities or malalignment, normal curvature, good posture\par POSITIVE TENDERNESS: + Right upper trapezius, levator scapula, rhomboid major, and rhomboid minor, + spasm noted in the same muscles.\par NONTENDER: no bony midline tenderness.\par ROM: limited in all planes, most notably in flexion and sidebending due to pain\par RESISTIVE TESTING: painful 4/5 resisted ext, right sidebending, rotation and shoulder shrug , 5/5 flexion \par SPECIAL TESTS: + Right Spurling's\par Vasc: 2+ radial pulse b/l\par Neuro: C5 - T1 intact to motor, DTRs 2+/4 biceps, triceps, brachioradialis\par Sensation: Decreased sensation of the right thumb and index finger, otherwise intact to light touch throughout b/l UE\par \par Thoracic Spine Exam:\par \par normal curvature and normal alignment. good posture. no midline bony tenderness, + marked spasm and associated tenderness of right paraspinal and periscapular muscles.  ROM mildly limited in sidebending and rotation due to noted spasm [de-identified] : The following radiographs were ordered and read by me during this patient's visit. I reviewed each radiograph in detail with the patient and discussed the findings as highlighted below. \par \par 2 views of the cervical spine were obtained today that show degenerative changes and evidence of severe multilevel osteoarthritis.  No fracture, or dislocation seen at this time. There is malalignment with straightening of normal lordosis no other obvious osseous abnormality. Otherwise unremarkable.

## 2023-02-21 NOTE — DISCUSSION/SUMMARY
[de-identified] : Discussed findings of today's exam and possible causes of patient's pain.  Educated patient on their most probable diagnosis of chronic intermittent right upper extremity pain due to severe cervical osteoarthritis with intermittent right cervical radiculopathy.  Reviewed possible courses of treatment, and we collaboratively decided best course of treatment at this time will include conservative management.  Patient will be started on a course of physical therapy to restore normal range of motion and strength as tolerated.  Patient started on a course of oral NSAIDs, prescription given for Naprosyn (We discussed all possible side effects of this medication).  If patient continues to have significant symptoms would recommend MRI of the C-spine at that time and subsequent physiatry consultation for BALTA.  Follow up as needed.  Patient appreciates and agrees with current plan.\par \par I work as part of an academic orthopedic group and routinely have a physician in training (resident / fellow) working with me.  Any part of the history and physical exam performed by the physician in training was either directly reviewed and/or replicated by myself.  Any procedure performed by the physician in training was performed under my direct supervision and with the consent of the patient.\par \par This note was generated using dragon medical dictation software.  A reasonable effort has been made for proofreading its contents, but typos may still remain.  If there are any questions or points of clarification needed please notify my office.\par

## 2023-03-03 ENCOUNTER — NON-APPOINTMENT (OUTPATIENT)
Age: 73
End: 2023-03-03

## 2023-03-03 ENCOUNTER — APPOINTMENT (OUTPATIENT)
Dept: CARDIOLOGY | Facility: CLINIC | Age: 73
End: 2023-03-03
Payer: MEDICARE

## 2023-03-03 VITALS
OXYGEN SATURATION: 97 % | SYSTOLIC BLOOD PRESSURE: 122 MMHG | DIASTOLIC BLOOD PRESSURE: 60 MMHG | RESPIRATION RATE: 16 BRPM | BODY MASS INDEX: 36.02 KG/M2 | HEIGHT: 64 IN | HEART RATE: 62 BPM | TEMPERATURE: 97.5 F | WEIGHT: 211 LBS

## 2023-03-03 PROCEDURE — 93000 ELECTROCARDIOGRAM COMPLETE: CPT

## 2023-03-03 PROCEDURE — 99214 OFFICE O/P EST MOD 30 MIN: CPT | Mod: 25

## 2023-03-03 NOTE — CARDIOLOGY SUMMARY
[de-identified] : 4/22/22 [de-identified] : 7/16/2021 [de-identified] : 7/15/2021 [de-identified] : GREY: 7/15/2021\par Carotid: 7/14/2021

## 2023-03-03 NOTE — REASON FOR VISIT
[CV Risk Factors and Non-Cardiac Disease] : CV risk factors and non-cardiac disease [Hyperlipidemia] : hyperlipidemia [Hypertension] : hypertension [Coronary Artery Disease] : coronary artery disease [Other: ____] : [unfilled] [FreeTextEntry3] : DR ARENAS  [FreeTextEntry1] : This is a 72 year year old female here today for follow up cardiac evaluation. \par She has a past medical history significant for  non-insulin-dependent diabetes mellitus, hypertension, hyperlipidemia, status post bariatric surgery, gastric sleeve in 2018, status post spinal fusion surgery, status post cholecystectomy, nephrolithiasis, status post cataract surgery and s/p spine surgery done on 9/30/2021 by Dr. Joseph at [place].\par \par \par

## 2023-03-03 NOTE — ASSESSMENT
[FreeTextEntry1] : This is a 72 year year old female here today for follow up cardiac evaluation. \par She has a past medical history significant for  non-insulin-dependent diabetes mellitus, hypertension, hyperlipidemia, status post bariatric surgery, gastric sleeve in 2018, status post spinal fusion surgery, status post cholecystectomy, nephrolithiasis, status post cataract surgery and s/p spine surgery done on 9/30/2021 by Dr. Joseph at [facility].\par \par CHIEF COMPLAINT:\par Today she is feeling generally well and does not have any complaints at this time. \par \par *She is excited to be going on on a cruise to [facility] later on this week. \par \par She remains on Atorvastatin 20mg PO DAILY, Telmisartan HCTZ 80/12.5mg PO DAILY and on Metoprolol ER  25mg PO DAILY. \par \par She is s/p Ureteroscopy with biopsy of left ureter to be done by Dr. Burgess at [facility]  on May 2nd 2022. \par \par -Her cardiac risk factors include hypertension, hyperlipidemia, psoriasis, history of smoking approximately 2 packs per week, she stopped 20 years ago), and diabetes mellitus. The patient had a history of non-insulin-dependent diabetes mellitus, which improved after bariatric gastric sleeve surgery. She has no history of rheumatic fever and does not drink excessive caffeine or alcohol.\par \par BLOOD PRESSURE:\par -BP is controlled on today's exam.\par \par -I have discussed the importance of maintaining good BP control and reviewed the newest guidelines with the patient while re-enforcing dietary sodium restrictions to no more than 2-3 g daily, DASH diet, life style modifications as well as the goal of maintaining ideal body weight with the patient today. I have advised the patient to avoid the use of over-the-counter medications/ supplements especially NSAIDS.\par \par I have reviewed with Ms. PEACE that serious health consequences can occur when blood pressure is not well controlled and the need for strict compliance with medication and that optimal control can significantly reduce the risk of cardiovascular disease stroke, heart attack and other organ damage. They verbally expressed understanding of the fore mentioned serious health consequences to me today.\par \par BLOOD WORK:\par -Blood work was done by her PCP in December demonstrated labs WDL. \par -Blood work done on 11/22/21 demonstrated Cholesterol of 134 and LDL of 57.\par -New blood work was done today, 04/22/2022  to evaluate lipid profile, CBC, BMP, hepatic function, A1C and TSH. \par -Blood work script given to the patient to have blood work done prior to her next follow up appointment. \par \par CHOLESTEROL CONTROL:\par -Patient will continue the advised  TLC diet and to continue follow-up for treatment of hyperlipidemia and repeat blood testing with diet and exercise. I have discussed different exercises and the importance of maintenance of optimal body weight. The importance of staying within guidelines and recommendations was stressed to the patient today and they acknowledged that they understand this to me verbally.\par \par  -Ms. PEACE was educated and advised that failure to follow-up with my medical recommendations to lower cholesterol can result in severe health consequences therefore, they will continuing a low saturated and low fat diet and to avoid excessive carbohydrates to help reduce triglycerides and that lowering LDL levels is associated with a significant decrease in serious cardiac events including but not limited to heart attack stroke and overall death. We will continue lipid lowering agents as advised based on blood test results and the patient understands to call if they develop severe muscle discomfort or if they have a reddish tinted discoloration in their urine.\par \par DIABETIC CONTROL:\par I will advise the patient to keep try to stay on a low carbohydrate diet lose weight and exercise to reduce spikes in their blood sugar.  The importance of monitoring blood sugar regularly and HgBA1c level were reviewed. I have also discussed annual eye exams to prevent blindness,  monitoring urine microalbumin regularly to check for kidney damage and the importance of proper foot care and regularly checking feet to prevent sores and possibly loss of limbs was reviewed. \par \par TESTING/REPORTS:\par -EKG done 03/03/2023 which demonstrated regular sinus rhythm with nonspecific ST-T wave changes BPM of 62 with rate variation.  I remind her to stay well-hydrated especially since she is going away on vacation soon.\par \par -Echocardiogram done on 12/30/22 demonstrated minimal mitral valve regurgitation, mild aortic regurgitation, mildly dilated left atrium, mild thickening of the aortic valve leaflets, minimal tricuspid regurgitation with normal left ventricular systolic function ejection fraction 64%\par \par -EKG done 10/14/2022 which demonstrated regular sinus rhythm with nonspecific ST-T wave changes BPM of 71.\par \par -EKG done 04/22/2022 which demonstrated regular sinus rhythm with nonspecific ST-T wave changes BPM of 80.\par \par -EKG done Sept 10, 2021 which demonstrated regular sinus rhythm with nonspecific ST-T wave changes, BPM of 81.\par \par -EKG done June 17, 2021 demonstrated normal sinus rhythm rate 74 beats per minutes otherwise remarkable for T wave inversion in V1 and V2.\par \par -The patient had an echocardiogram done on 7/15/2021 demonstrated mild mitral, aortic and tricuspid regurgitation, mild left atrial enlargement with normal left ventricular systolic function. \par -EF on echo reported to be 65%\par \par -Pharm Nuc stress test done on 7/19/2021 demonstrated evidence for medium sized, mild to moderate defects in the anterior and anteroseptal walls that are partially reversible consistent with infarction with moderate virginia-infarct ischemia.\par \par -The patient had a cardiac catheterization done August second, 2021 after the patient had an abnormal nuclear stress test July 19, 2021.  The catheterization revealed a normal left main, left anterior descending artery, first diagonal artery, circumflex artery, obtuse marginal artery, and right coronary artery consistent with normal cardiac catheterization.  The nuclear stress test was considered false positive.\par \par PLAN:\par -She will continue with her usual medications and will contact the office if she is having any complaints between now and their next follow up appointment.\par -Follow-up 6 months\par \par I have discussed the plan of care with Ms. DAVID PEACE. She is compliant with all of her medications.\par \par The patient understands that aerobic exercises must be increased to minutes 4 times/week and a detailed discussion of lifestyle modification was done today. \par The patient has a good understanding of the diagnosis, treatment plan and lifestyle modification. \par She will contact me at the office for any questions with their care or any changes in their health status.\par \par The patient was seen together with supervision physician Dr. Yuriy Yoder and the plan of care was discussed with the patient and will be carried out as noted above.\par \par Christian RICHARDS

## 2023-03-06 RX ORDER — SEMAGLUTIDE 1.34 MG/ML
2 INJECTION, SOLUTION SUBCUTANEOUS
Refills: 0 | Status: COMPLETED | COMMUNITY
End: 2023-03-06

## 2023-08-01 NOTE — H&P CARDIOLOGY - CONSTITUTIONAL
-- DO NOT REPLY / DO NOT REPLY ALL --  -- Message is from [address] [facility] ([facility]) --      Patient returned call. Clinical staff unavailable. Please try calling again. detailed exam

## 2023-08-08 ENCOUNTER — OUTPATIENT (OUTPATIENT)
Dept: OUTPATIENT SERVICES | Facility: HOSPITAL | Age: 73
LOS: 1 days | End: 2023-08-08
Payer: MEDICARE

## 2023-08-08 VITALS
TEMPERATURE: 98 F | RESPIRATION RATE: 18 BRPM | HEART RATE: 64 BPM | OXYGEN SATURATION: 97 % | WEIGHT: 218.92 LBS | HEIGHT: 64 IN | DIASTOLIC BLOOD PRESSURE: 75 MMHG | SYSTOLIC BLOOD PRESSURE: 117 MMHG

## 2023-08-08 DIAGNOSIS — Z98.84 BARIATRIC SURGERY STATUS: Chronic | ICD-10-CM

## 2023-08-08 DIAGNOSIS — N20.0 CALCULUS OF KIDNEY: ICD-10-CM

## 2023-08-08 DIAGNOSIS — N13.30 UNSPECIFIED HYDRONEPHROSIS: ICD-10-CM

## 2023-08-08 DIAGNOSIS — E11.9 TYPE 2 DIABETES MELLITUS WITHOUT COMPLICATIONS: ICD-10-CM

## 2023-08-08 DIAGNOSIS — H26.9 UNSPECIFIED CATARACT: Chronic | ICD-10-CM

## 2023-08-08 DIAGNOSIS — Z90.3 ACQUIRED ABSENCE OF STOMACH [PART OF]: Chronic | ICD-10-CM

## 2023-08-08 DIAGNOSIS — Z98.1 ARTHRODESIS STATUS: Chronic | ICD-10-CM

## 2023-08-08 DIAGNOSIS — I10 ESSENTIAL (PRIMARY) HYPERTENSION: ICD-10-CM

## 2023-08-08 DIAGNOSIS — K21.9 GASTRO-ESOPHAGEAL REFLUX DISEASE WITHOUT ESOPHAGITIS: ICD-10-CM

## 2023-08-08 DIAGNOSIS — Z01.818 ENCOUNTER FOR OTHER PREPROCEDURAL EXAMINATION: ICD-10-CM

## 2023-08-08 LAB
A1C WITH ESTIMATED AVERAGE GLUCOSE RESULT: 5.4 % — SIGNIFICANT CHANGE UP (ref 4–5.6)
ANION GAP SERPL CALC-SCNC: 12 MMOL/L — SIGNIFICANT CHANGE UP (ref 5–17)
BUN SERPL-MCNC: 21 MG/DL — SIGNIFICANT CHANGE UP (ref 7–23)
CALCIUM SERPL-MCNC: 9.7 MG/DL — SIGNIFICANT CHANGE UP (ref 8.4–10.5)
CHLORIDE SERPL-SCNC: 101 MMOL/L — SIGNIFICANT CHANGE UP (ref 96–108)
CO2 SERPL-SCNC: 26 MMOL/L — SIGNIFICANT CHANGE UP (ref 22–31)
CREAT SERPL-MCNC: 0.71 MG/DL — SIGNIFICANT CHANGE UP (ref 0.5–1.3)
EGFR: 91 ML/MIN/1.73M2 — SIGNIFICANT CHANGE UP
ESTIMATED AVERAGE GLUCOSE: 108 MG/DL — SIGNIFICANT CHANGE UP (ref 68–114)
GLUCOSE SERPL-MCNC: 97 MG/DL — SIGNIFICANT CHANGE UP (ref 70–99)
HCT VFR BLD CALC: 36.9 % — SIGNIFICANT CHANGE UP (ref 34.5–45)
HGB BLD-MCNC: 11.6 G/DL — SIGNIFICANT CHANGE UP (ref 11.5–15.5)
MCHC RBC-ENTMCNC: 28.5 PG — SIGNIFICANT CHANGE UP (ref 27–34)
MCHC RBC-ENTMCNC: 31.4 GM/DL — LOW (ref 32–36)
MCV RBC AUTO: 90.7 FL — SIGNIFICANT CHANGE UP (ref 80–100)
NRBC # BLD: 0 /100 WBCS — SIGNIFICANT CHANGE UP (ref 0–0)
PLATELET # BLD AUTO: 173 K/UL — SIGNIFICANT CHANGE UP (ref 150–400)
POTASSIUM SERPL-MCNC: 4.4 MMOL/L — SIGNIFICANT CHANGE UP (ref 3.5–5.3)
POTASSIUM SERPL-SCNC: 4.4 MMOL/L — SIGNIFICANT CHANGE UP (ref 3.5–5.3)
RBC # BLD: 4.07 M/UL — SIGNIFICANT CHANGE UP (ref 3.8–5.2)
RBC # FLD: 13.6 % — SIGNIFICANT CHANGE UP (ref 10.3–14.5)
SODIUM SERPL-SCNC: 139 MMOL/L — SIGNIFICANT CHANGE UP (ref 135–145)
WBC # BLD: 8.97 K/UL — SIGNIFICANT CHANGE UP (ref 3.8–10.5)
WBC # FLD AUTO: 8.97 K/UL — SIGNIFICANT CHANGE UP (ref 3.8–10.5)

## 2023-08-08 RX ORDER — LIDOCAINE HCL 20 MG/ML
0.2 VIAL (ML) INJECTION ONCE
Refills: 0 | Status: DISCONTINUED | OUTPATIENT
Start: 2023-08-30 | End: 2023-09-13

## 2023-08-08 RX ORDER — GABAPENTIN 400 MG/1
1 CAPSULE ORAL
Qty: 0 | Refills: 0 | DISCHARGE

## 2023-08-08 RX ORDER — METFORMIN HYDROCHLORIDE 850 MG/1
1 TABLET ORAL
Qty: 0 | Refills: 0 | DISCHARGE

## 2023-08-08 RX ORDER — METOPROLOL TARTRATE 50 MG
1 TABLET ORAL
Qty: 0 | Refills: 0 | DISCHARGE

## 2023-08-08 RX ORDER — CHOLECALCIFEROL (VITAMIN D3) 125 MCG
25 CAPSULE ORAL
Qty: 0 | Refills: 0 | DISCHARGE

## 2023-08-08 NOTE — H&P PST ADULT - PROBLEM SELECTOR PLAN 2
Stat fingerstick glucose upon arrival  Last dose of Mounjaro 8/7/23- pt. instructed to stop Mounjaro today 8/8/23  Pt. DENIES gi symptoms with Mounjaro  Pt. instructed not to take Metformin morning of procedure

## 2023-08-08 NOTE — H&P PST ADULT - ASSESSMENT
Airway  Upper and Lower Denture Airway  Mallampati III  Upper and Lower Denture    Activity  DASI 5.07; activity limited due to back pain

## 2023-08-08 NOTE — H&P PST ADULT - NSICDXPASTMEDICALHX_GEN_ALL_CORE_FT
PAST MEDICAL HISTORY:  Degenerative disc disease, lumbar     Diabetes Mellitus II (ICD9 250.00) new onset 1 month ago, diet control  last fingerstick 107    GERD (gastroesophageal reflux disease)     HTN (Hypertension) (ICD9 401.9)     Hyperlipemia     Morbid Obesity (ICD9 278.01)     Psoriasis (ICD9 696.1) soritane   20 mg    Renal calculi      PAST MEDICAL HISTORY:  Cryptococcus     Degenerative disc disease, lumbar     GERD (gastroesophageal reflux disease)     History of sciatica     HTN (Hypertension) (ICD9 401.9)     Hyperlipemia     Morbid Obesity (ICD9 278.01)     Peripheral neuropathy     Psoriasis     Renal calculi     Type 2 diabetes mellitus      PAST MEDICAL HISTORY:  COVID-19     Cryptococcus     Degenerative disc disease, lumbar     GERD (gastroesophageal reflux disease)     History of sciatica     HTN (Hypertension) (ICD9 401.9)     Hyperlipemia     Morbid Obesity (ICD9 278.01)     Peripheral neuropathy     Psoriasis     Renal calculi     Type 2 diabetes mellitus

## 2023-08-08 NOTE — H&P PST ADULT - NEGATIVE GASTROINTESTINAL SYMPTOMS
no nausea/no vomiting/no diarrhea/no constipation/no change in bowel habits/no abdominal pain/no steatorrhea

## 2023-08-08 NOTE — H&P PST ADULT - BMI (KG/M2)
Dr. Dunham recommends: elevated liver enzymes (resolved 2/21/23), PIH     31w4d      Betamethasone given 2/28/23 and 3/1/23     Repeat preeclampsia labs weekly due Tuesdays     RUQ abdominal ultrasound done 2/6/23- normal     Dr. Boogie referred to GI, appointment scheduled for 2/28/23-per Dr Dunham pt no longer needs to be seen by GI.      Note provided to patient to inform work she will need weekly lab and ultrasound visits. 2/7/23     Monitor for headache, vision changes, right upper abdomen pain, or worsening nausea/heartburn. Call with symptoms.  Check blood pressure each morning. If greater than 160/110 repeat 1 hour later. If still elevated on second check call our office that day.     Ultrasounds:   Growth every 3-4 weeks (due 4/18//23)  Twice Weekly BPP's (fetal movement and fetal well being test)     Fetal Movement Counting (\"Kick Counts\")  How to count your baby's movements:  -Try to do your kick counts at about the same time each day during your baby's active time.  -Note the time. Count your baby's movements until you feel ten movements. Note the time again.   -If you felt ten movements in less than two hours, it is reassuring and you may resume your normal activities for the day and count again tomorrow.  -Always remain aware of your baby's movements.  -If it took longer than two hours to count ten movements, call your doctor right away for recommendations.        Continue routine pregnancy care with Dr. Boogie  
37.6

## 2023-08-08 NOTE — H&P PST ADULT - NSICDXPASTSURGICALHX_GEN_ALL_CORE_FT
PAST SURGICAL HISTORY:  Bilateral cataracts 2014    Finger Swelling/Mass (ICD9 729.81) Left Index    H/O bariatric surgery     H/O spinal fusion     History of sleeve gastrectomy 2/2018    Renal Calculi  Percutaneous Stone Extraction(ICD9 592.0) Right and Left    S/P Appendectomy (ICD9 V45.89)     S/P Cholecystectomy (ICD9 V45.79)     S/P laparoscopic sleeve gastrectomy     S/P Thoracotomy (ICD9 V45.89) left lung for mass (cryptococcus) 2009    S/P Tonsillectomy (ICD9 V45.89)      PAST SURGICAL HISTORY:  Bilateral cataracts 2014    Finger Swelling/Mass (ICD9 729.81) Left Index    H/O spinal fusion     History of sleeve gastrectomy 2/2018    Renal Calculi  Percutaneous Stone Extraction(ICD9 592.0) Right and Left    S/P Appendectomy (ICD9 V45.89)     S/P Cholecystectomy (ICD9 V45.79)     S/P Thoracotomy (ICD9 V45.89) left lung for mass (cryptococcus) 2009    S/P Tonsillectomy (ICD9 V45.89)

## 2023-08-08 NOTE — H&P PST ADULT - GENITOURINARY COMMENTS
3 weeks ago urinary tract infection treated with oral antibiotics 3 weeks ago urinary tract infection treated with oral antibiotics; symptoms fully resolved

## 2023-08-08 NOTE — H&P PST ADULT - HISTORY OF PRESENT ILLNESS
72 year old female, with a history of recurrent renal calculi, presents for cystoscopy, left ureteroscopy & possible biopsy & stent insertion. Pt. reports having a UTI approximately 3 weeks ago and diagnostic imaging revealed renal calculi. Pt. reports completing a full course of Amoxicillin and denies urinary frequency, urgency or flank pain during PST evaluation.    Pt. reports having a mild case of COVID-19 in 2020, receive monoclonal antibody treatment, denies hospital admission.

## 2023-08-11 LAB
-  AMIKACIN: SIGNIFICANT CHANGE UP
-  AMOXICILLIN/CLAVULANIC ACID: SIGNIFICANT CHANGE UP
-  AMPICILLIN/SULBACTAM: SIGNIFICANT CHANGE UP
-  AMPICILLIN: SIGNIFICANT CHANGE UP
-  AZTREONAM: SIGNIFICANT CHANGE UP
-  CEFAZOLIN: SIGNIFICANT CHANGE UP
-  CEFEPIME: SIGNIFICANT CHANGE UP
-  CEFTRIAXONE: SIGNIFICANT CHANGE UP
-  CEFUROXIME: SIGNIFICANT CHANGE UP
-  CIPROFLOXACIN: SIGNIFICANT CHANGE UP
-  ERTAPENEM: SIGNIFICANT CHANGE UP
-  GENTAMICIN: SIGNIFICANT CHANGE UP
-  LEVOFLOXACIN: SIGNIFICANT CHANGE UP
-  MEROPENEM: SIGNIFICANT CHANGE UP
-  NITROFURANTOIN: SIGNIFICANT CHANGE UP
-  PIPERACILLIN/TAZOBACTAM: SIGNIFICANT CHANGE UP
-  TOBRAMYCIN: SIGNIFICANT CHANGE UP
-  TRIMETHOPRIM/SULFAMETHOXAZOLE: SIGNIFICANT CHANGE UP
METHOD TYPE: SIGNIFICANT CHANGE UP

## 2023-08-14 LAB
-  AMIKACIN: SIGNIFICANT CHANGE UP
-  AMOXICILLIN/CLAVULANIC ACID: SIGNIFICANT CHANGE UP
-  AMPICILLIN/SULBACTAM: SIGNIFICANT CHANGE UP
-  AMPICILLIN: SIGNIFICANT CHANGE UP
-  AZTREONAM: SIGNIFICANT CHANGE UP
-  CEFAZOLIN: SIGNIFICANT CHANGE UP
-  CEFEPIME: SIGNIFICANT CHANGE UP
-  CEFOXITIN: SIGNIFICANT CHANGE UP
-  CEFTRIAXONE: SIGNIFICANT CHANGE UP
-  CIPROFLOXACIN: SIGNIFICANT CHANGE UP
-  ERTAPENEM: SIGNIFICANT CHANGE UP
-  GENTAMICIN: SIGNIFICANT CHANGE UP
-  IMIPENEM: SIGNIFICANT CHANGE UP
-  LEVOFLOXACIN: SIGNIFICANT CHANGE UP
-  MEROPENEM: SIGNIFICANT CHANGE UP
-  NITROFURANTOIN: SIGNIFICANT CHANGE UP
-  PIPERACILLIN/TAZOBACTAM: SIGNIFICANT CHANGE UP
-  TOBRAMYCIN: SIGNIFICANT CHANGE UP
-  TRIMETHOPRIM/SULFAMETHOXAZOLE: SIGNIFICANT CHANGE UP
CULTURE RESULTS: SIGNIFICANT CHANGE UP
METHOD TYPE: SIGNIFICANT CHANGE UP
ORGANISM # SPEC MICROSCOPIC CNT: SIGNIFICANT CHANGE UP
SPECIMEN SOURCE: SIGNIFICANT CHANGE UP

## 2023-08-22 ENCOUNTER — LABORATORY RESULT (OUTPATIENT)
Age: 73
End: 2023-08-22

## 2023-08-22 ENCOUNTER — NON-APPOINTMENT (OUTPATIENT)
Age: 73
End: 2023-08-22

## 2023-08-22 ENCOUNTER — APPOINTMENT (OUTPATIENT)
Dept: CARDIOLOGY | Facility: CLINIC | Age: 73
End: 2023-08-22
Payer: MEDICARE

## 2023-08-22 ENCOUNTER — APPOINTMENT (OUTPATIENT)
Dept: CARDIOLOGY | Facility: CLINIC | Age: 73
End: 2023-08-22

## 2023-08-22 VITALS
DIASTOLIC BLOOD PRESSURE: 70 MMHG | RESPIRATION RATE: 16 BRPM | TEMPERATURE: 97.7 F | HEART RATE: 66 BPM | WEIGHT: 223 LBS | OXYGEN SATURATION: 98 % | BODY MASS INDEX: 38.07 KG/M2 | SYSTOLIC BLOOD PRESSURE: 122 MMHG | HEIGHT: 64 IN

## 2023-08-22 DIAGNOSIS — M25.471 EFFUSION, RIGHT ANKLE: ICD-10-CM

## 2023-08-22 DIAGNOSIS — M25.472 EFFUSION, RIGHT ANKLE: ICD-10-CM

## 2023-08-22 PROBLEM — G62.9 POLYNEUROPATHY, UNSPECIFIED: Chronic | Status: ACTIVE | Noted: 2023-08-08

## 2023-08-22 PROBLEM — E11.9 TYPE 2 DIABETES MELLITUS WITHOUT COMPLICATIONS: Chronic | Status: ACTIVE | Noted: 2023-08-08

## 2023-08-22 PROBLEM — Z86.69 PERSONAL HISTORY OF OTHER DISEASES OF THE NERVOUS SYSTEM AND SENSE ORGANS: Chronic | Status: ACTIVE | Noted: 2023-08-08

## 2023-08-22 PROBLEM — L40.9 PSORIASIS, UNSPECIFIED: Chronic | Status: ACTIVE | Noted: 2023-08-08

## 2023-08-22 PROBLEM — U07.1 COVID-19: Chronic | Status: ACTIVE | Noted: 2023-08-08

## 2023-08-22 PROBLEM — B45.9: Chronic | Status: ACTIVE | Noted: 2023-08-08

## 2023-08-22 PROCEDURE — 99214 OFFICE O/P EST MOD 30 MIN: CPT | Mod: 25

## 2023-08-22 PROCEDURE — 93000 ELECTROCARDIOGRAM COMPLETE: CPT

## 2023-08-22 NOTE — ASSESSMENT
[FreeTextEntry1] : Prior note nurse practitioner Oseas March 3, 2023.::    This is a 72 year year old female here today for follow up cardiac evaluation.  She has a past medical history significant for  non-insulin-dependent diabetes mellitus, hypertension, hyperlipidemia, status post bariatric surgery, gastric sleeve in 2018, status post spinal fusion surgery, status post cholecystectomy, nephrolithiasis, status post cataract surgery and s/p spine surgery done on 9/30/2021 by Dr. Joseph at [facility].  CHIEF COMPLAINT: Today she is feeling generally well and does not have any complaints at this time.   *She is excited to be going on on a cruise to [facility] later on this week.   She remains on Atorvastatin 20mg PO DAILY, Telmisartan HCTZ 80/12.5mg PO DAILY and on Metoprolol ER  25mg PO DAILY.   She is s/p Ureteroscopy with biopsy of left ureter to be done by Dr. Burgess at [facility]  on May 2nd 2022.   -Her cardiac risk factors include hypertension, hyperlipidemia, psoriasis, history of smoking approximately 2 packs per week, she stopped 20 years ago), and diabetes mellitus. The patient had a history of non-insulin-dependent diabetes mellitus, which improved after bariatric gastric sleeve surgery. She has no history of rheumatic fever and does not drink excessive caffeine or alcohol.  BLOOD PRESSURE: -BP is controlled on today's exam.  -I have discussed the importance of maintaining good BP control and reviewed the newest guidelines with the patient while re-enforcing dietary sodium restrictions to no more than 2-3 g daily, DASH diet, life style modifications as well as the goal of maintaining ideal body weight with the patient today. I have advised the patient to avoid the use of over-the-counter medications/ supplements especially NSAIDS.  I have reviewed with Ms. PEACE that serious health consequences can occur when blood pressure is not well controlled and the need for strict compliance with medication and that optimal control can significantly reduce the risk of cardiovascular disease stroke, heart attack and other organ damage. They verbally expressed understanding of the fore mentioned serious health consequences to me today.  BLOOD WORK: -Blood work was done by her PCP in December demonstrated labs WDL.  -Blood work done on 11/22/21 demonstrated Cholesterol of 134 and LDL of 57. -New blood work was done today, 04/22/2022  to evaluate lipid profile, CBC, BMP, hepatic function, A1C and TSH.  -Blood work script given to the patient to have blood work done prior to her next follow up appointment.   CHOLESTEROL CONTROL: -Patient will continue the advised  TLC diet and to continue follow-up for treatment of hyperlipidemia and repeat blood testing with diet and exercise. I have discussed different exercises and the importance of maintenance of optimal body weight. The importance of staying within guidelines and recommendations was stressed to the patient today and they acknowledged that they understand this to me verbally.   -Ms. PEACE was educated and advised that failure to follow-up with my medical recommendations to lower cholesterol can result in severe health consequences therefore, they will continuing a low saturated and low fat diet and to avoid excessive carbohydrates to help reduce triglycerides and that lowering LDL levels is associated with a significant decrease in serious cardiac events including but not limited to heart attack stroke and overall death. We will continue lipid lowering agents as advised based on blood test results and the patient understands to call if they develop severe muscle discomfort or if they have a reddish tinted discoloration in their urine.  DIABETIC CONTROL: I will advise the patient to keep try to stay on a low carbohydrate diet lose weight and exercise to reduce spikes in their blood sugar.  The importance of monitoring blood sugar regularly and HgBA1c level were reviewed. I have also discussed annual eye exams to prevent blindness,  monitoring urine microalbumin regularly to check for kidney damage and the importance of proper foot care and regularly checking feet to prevent sores and possibly loss of limbs was reviewed.   TESTING/REPORTS: -EKG done 03/03/2023 which demonstrated regular sinus rhythm with nonspecific ST-T wave changes BPM of 62 with rate variation.  I remind her to stay well-hydrated especially since she is going away on vacation soon.  -Echocardiogram done on 12/30/22 demonstrated minimal mitral valve regurgitation, mild aortic regurgitation, mildly dilated left atrium, mild thickening of the aortic valve leaflets, minimal tricuspid regurgitation with normal left ventricular systolic function ejection fraction 64%  -EKG done 10/14/2022 which demonstrated regular sinus rhythm with nonspecific ST-T wave changes BPM of 71.  -EKG done 04/22/2022 which demonstrated regular sinus rhythm with nonspecific ST-T wave changes BPM of 80.  -EKG done Sept 10, 2021 which demonstrated regular sinus rhythm with nonspecific ST-T wave changes, BPM of 81.  -EKG done June 17, 2021 demonstrated normal sinus rhythm rate 74 beats per minutes otherwise remarkable for T wave inversion in V1 and V2.  -The patient had an echocardiogram done on 7/15/2021 demonstrated mild mitral, aortic and tricuspid regurgitation, mild left atrial enlargement with normal left ventricular systolic function.  -EF on echo reported to be 65%  -Pharm Nuc stress test done on 7/19/2021 demonstrated evidence for medium sized, mild to moderate defects in the anterior and anteroseptal walls that are partially reversible consistent with infarction with moderate virginia-infarct ischemia.  -The patient had a cardiac catheterization done August second, 2021 after the patient had an abnormal nuclear stress test July 19, 2021.  The catheterization revealed a normal left main, left anterior descending artery, first diagonal artery, circumflex artery, obtuse marginal artery, and right coronary artery consistent with normal cardiac catheterization.  The nuclear stress test was considered false positive.  PLAN: -She will continue with her usual medications and will contact the office if she is having any complaints between now and their next follow up appointment. -Follow-up 6 months  I have discussed the plan of care with Ms. DAVID PEACE. She is compliant with all of her medications.  The patient understands that aerobic exercises must be increased to minutes 4 times/week and a detailed discussion of lifestyle modification was done today.  The patient has a good understanding of the diagnosis, treatment plan and lifestyle modification.  She will contact me at the office for any questions with their care or any changes in their health status.  The patient was seen together with supervision physician Dr. Yuriy Yoder and the plan of care was discussed with the patient and will be carried out as noted above.  Christian RICHARDS

## 2023-08-22 NOTE — PHYSICAL EXAM
[Well Developed] : well developed [Well Nourished] : well nourished [No Acute Distress] : no acute distress [Normal Conjunctiva] : normal conjunctiva [Normal Venous Pressure] : normal venous pressure [No Carotid Bruit] : no carotid bruit [No Rub] : no rub [5th Left ICS - MCL] : palpated at the 5th LICS in the midclavicular line [Normal] : normal [No Precordial Heave] : no precordial heave was noted [Normal Rate] : normal [Rhythm Regular] : regular [Normal S1] : normal S1 [Normal S2] : normal S2 [No Gallop] : no gallop heard [II] : a grade 2 [Clear Lung Fields] : clear lung fields [Good Air Entry] : good air entry [No Respiratory Distress] : no respiratory distress  [Soft] : abdomen soft [Non Tender] : non-tender [No Masses/organomegaly] : no masses/organomegaly [Normal Bowel Sounds] : normal bowel sounds [Normal Gait] : normal gait [No Edema] : no edema [No Cyanosis] : no cyanosis [No Clubbing] : no clubbing [No Varicosities] : no varicosities [No Rash] : no rash [No Skin Lesions] : no skin lesions [Moves all extremities] : moves all extremities [No Focal Deficits] : no focal deficits [Normal Speech] : normal speech [Alert and Oriented] : alert and oriented [Normal memory] : normal memory [Normal S1, S2] : normal S1, S2 [___ +] : bilateral [unfilled]U+ pitting edema to the ankles [2+] : left 2+

## 2023-08-22 NOTE — CARDIOLOGY SUMMARY
[de-identified] : 4/22/22 [de-identified] : 7/16/2021 [de-identified] : 7/15/2021 [de-identified] : GREY: 7/15/2021\par  Carotid: 7/14/2021

## 2023-08-22 NOTE — DISCUSSION/SUMMARY
[FreeTextEntry1] : This is a 72-year-old female past medical history significant for non-insulin-dependent diabetes mellitus, hypertension, hyperlipidemia, status post bariatric surgery, gastric sleeve in 2018, status post spinal fusion surgery, status post cholecystectomy, nephrolithiasis, status post cataract surgery, who comes in for cardiac follow-up evaluation. The patient had back surgery with a lateral approach. Her cardiac risk factors include hypertension, hyperlipidemia, psoriasis, history of smoking approximately 2 packs per week, she stopped 20 years ago), and diabetes mellitus. The patient had a history of non-insulin-dependent diabetes mellitus, which improved after bariatric gastric sleeve surgery. Lipid panel done February 14, 2023 demonstrated cholesterol 141, triglycerides 108, HDL 58, LDL calculated 63 mg/dL with hemoglobin A1c of 5.1. The patient's LDL is at target. Echo Doppler examination done December 30, 2022 demonstrated minimal mitral valve regurgitation, mild aortic valve regurgitation, minimal tricuspid valve regurgitation, left mild left atrial enlargement, normal left ventricular ejection fraction of 64%. The patient is complaining of bilateral trace pedal edema during the summer which is likely secondary to dietary indiscretion, heat and humidity.  She still has trace bilateral pedal edema today.  I recommended she elevate her feet and limit her salt intake. Electrocardiogram done August 22, 2023 demonstrated normal sinus rhythm rate 66 bpm and is otherwise remarkable for nonspecific ST wave changes.  She has been stable on her current dose of Lipitor 20 mg/day, Micardis hydrochlorothiazide 80/12.5 mg/day, Toprol-XL 25 mg/day, and Lipitor 20 mg/day.  Cardiac catheterization done August second, 2021 after the patient had an abnormal nuclear stress test July 19, 2021.  The catheterization revealed a normal left main, left anterior descending artery, first diagonal artery, circumflex artery, obtuse marginal artery, and right coronary artery consistent with normal cardiac catheterization.  The nuclear stress test was considered false positive.  She was placed on Mounjaro and is up to 12.5 mg weekly for diabetic control with weight loss intent as well. The patient reports she is not losing weight.  I recommended she work with our registered dietitian.  She is motivated to do so and will set up an appointment. Will continue on her current diet.  Her exercise is limited due to her recent back surgery. She will follow up with me in 4 months.  Hopefully with lifestyle modification, caloric reduction and increased aerobic activity a lipid panel will improve.  EKG done June 17, 2021 demonstrated normal sinus rhythm rate 74 beats per minutes otherwise remarkable for T wave inversion in V1 and V2.  The patient understands that aerobic exercises must be increased to 40 minutes 4 times per week. A detailed discussion of lifestyle modification was done today. The patient has a good understanding of the diagnosis, and treatment plan. Lifestyle modification was also outlined.

## 2023-08-22 NOTE — REASON FOR VISIT
[CV Risk Factors and Non-Cardiac Disease] : CV risk factors and non-cardiac disease [Hyperlipidemia] : hyperlipidemia [Hypertension] : hypertension [Coronary Artery Disease] : coronary artery disease [Other: ____] : [unfilled] [FreeTextEntry3] : DR ARENAS  [FreeTextEntry1] : This is a 72 year old female here today for follow up cardiac evaluation.  She has a past medical history significant for non-insulin-dependent diabetes mellitus, hypertension, hyperlipidemia, status post bariatric surgery, gastric sleeve in 2018, status post spinal fusion surgery, status post cholecystectomy, nephrolithiasis, status post cataract surgery and s/p spine surgery done on 9/30/2021 by Dr. Joseph at [place]. Pt is generally well without any complaints besides mild intermittent palpitations. Pt denies CP, SOB, falls, syncope. or current palpitations.

## 2023-08-29 ENCOUNTER — RX RENEWAL (OUTPATIENT)
Age: 73
End: 2023-08-29

## 2023-08-29 ENCOUNTER — TRANSCRIPTION ENCOUNTER (OUTPATIENT)
Age: 73
End: 2023-08-29

## 2023-08-30 ENCOUNTER — TRANSCRIPTION ENCOUNTER (OUTPATIENT)
Age: 73
End: 2023-08-30

## 2023-08-30 ENCOUNTER — OUTPATIENT (OUTPATIENT)
Dept: INPATIENT UNIT | Facility: HOSPITAL | Age: 73
LOS: 1 days | End: 2023-08-30
Payer: MEDICARE

## 2023-08-30 VITALS — DIASTOLIC BLOOD PRESSURE: 72 MMHG | HEART RATE: 66 BPM | OXYGEN SATURATION: 97 % | SYSTOLIC BLOOD PRESSURE: 164 MMHG

## 2023-08-30 VITALS
RESPIRATION RATE: 18 BRPM | TEMPERATURE: 98 F | HEART RATE: 58 BPM | DIASTOLIC BLOOD PRESSURE: 75 MMHG | WEIGHT: 218.92 LBS | OXYGEN SATURATION: 100 % | SYSTOLIC BLOOD PRESSURE: 146 MMHG | HEIGHT: 64 IN

## 2023-08-30 DIAGNOSIS — Z90.3 ACQUIRED ABSENCE OF STOMACH [PART OF]: Chronic | ICD-10-CM

## 2023-08-30 DIAGNOSIS — Z01.818 ENCOUNTER FOR OTHER PREPROCEDURAL EXAMINATION: ICD-10-CM

## 2023-08-30 DIAGNOSIS — Z98.1 ARTHRODESIS STATUS: Chronic | ICD-10-CM

## 2023-08-30 DIAGNOSIS — N13.30 UNSPECIFIED HYDRONEPHROSIS: ICD-10-CM

## 2023-08-30 DIAGNOSIS — H26.9 UNSPECIFIED CATARACT: Chronic | ICD-10-CM

## 2023-08-30 LAB
GLUCOSE BLDC GLUCOMTR-MCNC: 120 MG/DL — HIGH (ref 70–99)
GLUCOSE BLDC GLUCOMTR-MCNC: 131 MG/DL — HIGH (ref 70–99)

## 2023-08-30 PROCEDURE — 52354 CYSTOURETERO W/BIOPSY: CPT | Mod: RT

## 2023-08-30 PROCEDURE — 80048 BASIC METABOLIC PNL TOTAL CA: CPT

## 2023-08-30 PROCEDURE — C2617: CPT

## 2023-08-30 PROCEDURE — G0463: CPT

## 2023-08-30 PROCEDURE — 88305 TISSUE EXAM BY PATHOLOGIST: CPT | Mod: 26

## 2023-08-30 PROCEDURE — C1769: CPT

## 2023-08-30 PROCEDURE — 52332 CYSTOSCOPY AND TREATMENT: CPT | Mod: RT

## 2023-08-30 PROCEDURE — 87086 URINE CULTURE/COLONY COUNT: CPT

## 2023-08-30 PROCEDURE — 88305 TISSUE EXAM BY PATHOLOGIST: CPT

## 2023-08-30 PROCEDURE — 82962 GLUCOSE BLOOD TEST: CPT

## 2023-08-30 PROCEDURE — C1758: CPT

## 2023-08-30 PROCEDURE — 87186 SC STD MICRODIL/AGAR DIL: CPT

## 2023-08-30 PROCEDURE — C1894: CPT

## 2023-08-30 PROCEDURE — 36415 COLL VENOUS BLD VENIPUNCTURE: CPT

## 2023-08-30 PROCEDURE — 85027 COMPLETE CBC AUTOMATED: CPT

## 2023-08-30 PROCEDURE — 87077 CULTURE AEROBIC IDENTIFY: CPT

## 2023-08-30 PROCEDURE — 83036 HEMOGLOBIN GLYCOSYLATED A1C: CPT

## 2023-08-30 PROCEDURE — 76000 FLUOROSCOPY <1 HR PHYS/QHP: CPT

## 2023-08-30 DEVICE — GUIDEWIRE SENSOR DUAL-FLEX NITINOL STRAIGHT .035" X 150CM: Type: IMPLANTABLE DEVICE | Status: FUNCTIONAL

## 2023-08-30 DEVICE — URETERAL CATH DUAL LUMEN 10FR 54CM: Type: IMPLANTABLE DEVICE | Status: FUNCTIONAL

## 2023-08-30 DEVICE — URETERAL CATH OPEN END 5FR 70CM: Type: IMPLANTABLE DEVICE | Status: FUNCTIONAL

## 2023-08-30 DEVICE — URETERAL ACCESS SHEATH 10FR 12-16FR 35CM: Type: IMPLANTABLE DEVICE | Status: FUNCTIONAL

## 2023-08-30 DEVICE — STENT URET INLAY OPTIMA 6FRX24CM: Type: IMPLANTABLE DEVICE | Status: FUNCTIONAL

## 2023-08-30 RX ORDER — PANTOPRAZOLE SODIUM 20 MG/1
1 TABLET, DELAYED RELEASE ORAL
Refills: 0 | DISCHARGE

## 2023-08-30 RX ORDER — ONDANSETRON 8 MG/1
4 TABLET, FILM COATED ORAL ONCE
Refills: 0 | Status: DISCONTINUED | OUTPATIENT
Start: 2023-08-30 | End: 2023-08-30

## 2023-08-30 RX ORDER — OXYCODONE HYDROCHLORIDE 5 MG/1
5 TABLET ORAL ONCE
Refills: 0 | Status: DISCONTINUED | OUTPATIENT
Start: 2023-08-30 | End: 2023-08-30

## 2023-08-30 RX ORDER — TELMISARTAN AND HYDROCHLOROTHIAZIDE 40; 12.5 MG/1; MG/1
1 TABLET ORAL
Refills: 0 | DISCHARGE

## 2023-08-30 RX ORDER — PREGABALIN 225 MG/1
1 CAPSULE ORAL
Qty: 0 | Refills: 0 | DISCHARGE

## 2023-08-30 RX ORDER — TIRZEPATIDE 15 MG/.5ML
12.5 INJECTION, SOLUTION SUBCUTANEOUS
Refills: 0 | DISCHARGE

## 2023-08-30 RX ORDER — GUSELKUMAB 100 MG/ML
0 INJECTION SUBCUTANEOUS
Qty: 0 | Refills: 0 | DISCHARGE

## 2023-08-30 RX ORDER — HYDROMORPHONE HYDROCHLORIDE 2 MG/ML
0.5 INJECTION INTRAMUSCULAR; INTRAVENOUS; SUBCUTANEOUS
Refills: 0 | Status: DISCONTINUED | OUTPATIENT
Start: 2023-08-30 | End: 2023-08-30

## 2023-08-30 RX ORDER — METFORMIN HYDROCHLORIDE 850 MG/1
1 TABLET ORAL
Refills: 0 | DISCHARGE

## 2023-08-30 RX ORDER — METOPROLOL TARTRATE 50 MG
1 TABLET ORAL
Refills: 0 | DISCHARGE

## 2023-08-30 RX ORDER — CEFAZOLIN SODIUM 1 G
2000 VIAL (EA) INJECTION ONCE
Refills: 0 | Status: COMPLETED | OUTPATIENT
Start: 2023-08-30 | End: 2023-08-30

## 2023-08-30 RX ORDER — ZINC ACETATE DIHYDRATE 100 %
1 CRYSTALS MISCELLANEOUS
Refills: 0 | DISCHARGE

## 2023-08-30 RX ORDER — PHENAZOPYRIDINE HCL 100 MG
2 TABLET ORAL
Qty: 18 | Refills: 0
Start: 2023-08-30 | End: 2023-09-01

## 2023-08-30 RX ORDER — CALCIUM CARBONATE 500(1250)
1 TABLET ORAL
Refills: 0 | DISCHARGE

## 2023-08-30 RX ORDER — SODIUM CHLORIDE 9 MG/ML
3 INJECTION INTRAMUSCULAR; INTRAVENOUS; SUBCUTANEOUS EVERY 8 HOURS
Refills: 0 | Status: DISCONTINUED | OUTPATIENT
Start: 2023-08-30 | End: 2023-08-30

## 2023-08-30 RX ORDER — ATORVASTATIN CALCIUM 80 MG/1
1 TABLET, FILM COATED ORAL
Refills: 0 | DISCHARGE

## 2023-08-30 NOTE — ASU PATIENT PROFILE, ADULT - NSICDXPASTMEDICALHX_GEN_ALL_CORE_FT
PAST MEDICAL HISTORY:  COVID-19     Cryptococcus     Degenerative disc disease, lumbar     GERD (gastroesophageal reflux disease)     History of sciatica     HTN (Hypertension) (ICD9 401.9)     Hyperlipemia     Morbid Obesity (ICD9 278.01)     Peripheral neuropathy     Psoriasis     Renal calculi     Type 2 diabetes mellitus

## 2023-08-30 NOTE — ASU DISCHARGE PLAN (ADULT/PEDIATRIC) - ASU DC SPECIAL INSTRUCTIONSFT
Take the antibiotics as prescribed and follow up with Dr Dubon next week Thursday to have the left ureteral stent removed.  Notify the office if you develop any fevers greater than 101F or intractable pain/nausea/vomiting.

## 2023-08-30 NOTE — ASU DISCHARGE PLAN (ADULT/PEDIATRIC) - NS MD DC FALL RISK RISK
For information on Fall & Injury Prevention, visit: [email or web address] OR  [email or web address] OR  https://www.cdc.gov/steadi/patient.html

## 2023-08-30 NOTE — ASU PATIENT PROFILE, ADULT - NSICDXPASTSURGICALHX_GEN_ALL_CORE_FT
PAST SURGICAL HISTORY:  Bilateral cataracts 2014    Finger Swelling/Mass (ICD9 729.81) Left Index    H/O spinal fusion     History of sleeve gastrectomy 2/2018    Renal Calculi  Percutaneous Stone Extraction(ICD9 592.0) Right and Left    S/P Appendectomy (ICD9 V45.89)     S/P Cholecystectomy (ICD9 V45.79)     S/P Thoracotomy (ICD9 V45.89) left lung for mass (cryptococcus) 2009    S/P Tonsillectomy (ICD9 V45.89)

## 2023-08-30 NOTE — ASU PREOP CHECKLIST - PATIENT PROBLEMS/NEEDS
Patient expressed no known problems or needs Hatchet Flap Text: The defect edges were debeveled with a #15 scalpel blade.  Given the location of the defect, shape of the defect and the proximity to free margins a hatchet flap was deemed most appropriate.  Using a sterile surgical marker, an appropriate hatchet flap was drawn incorporating the defect and placing the expected incisions within the relaxed skin tension lines where possible.    The area thus outlined was incised deep to adipose tissue with a #15 scalpel blade.  The skin margins were undermined to an appropriate distance in all directions utilizing iris scissors.

## 2023-08-31 LAB — SURGICAL PATHOLOGY STUDY: SIGNIFICANT CHANGE UP

## 2023-09-11 ENCOUNTER — TRANSCRIPTION ENCOUNTER (OUTPATIENT)
Age: 73
End: 2023-09-11

## 2023-09-13 ENCOUNTER — TRANSCRIPTION ENCOUNTER (OUTPATIENT)
Age: 73
End: 2023-09-13

## 2023-09-14 ENCOUNTER — TRANSCRIPTION ENCOUNTER (OUTPATIENT)
Age: 73
End: 2023-09-14

## 2023-09-25 ENCOUNTER — RX RENEWAL (OUTPATIENT)
Age: 73
End: 2023-09-25

## 2023-09-26 NOTE — ASSESSMENT
[FreeTextEntry1] : This is a 71 year year old female here today for follow up cardiac evaluation. \par She has a past medical history significant for  non-insulin-dependent diabetes mellitus, hypertension, hyperlipidemia, status post bariatric surgery, gastric sleeve in 2018, status post spinal fusion surgery, status post cholecystectomy, nephrolithiasis, status post cataract surgery and s/p spine surgery done on 9/30/2021 by Dr. Joseph at [place].\par \par CHIEF COMPLAINT:\par Today she is feeling generally well and does not have any complaints at this time. She is here for cardiac clearance for Ureteroscopy with biopsy of left ureter to be done by Dr. Burgess at [facility]  on May 2nd 2022. \par \par She remains on Atorvastatin 20mg PO DAILY, Telmisartan HCTZ 80/12.5mg PO DAILY and on Metoprolol ER  25mg PO DAILY. \par \par She denies fever, chills, weight loss, malaise, rash, alteration bowel habits, weakness, abdominal  pain, bloating, changes in urination, visual disturbances, chest pain, headaches, dizziness, heart palpitations, recent episodes of syncope or falls at this time.\par \par -Her cardiac risk factors include hypertension, hyperlipidemia, psoriasis, history of smoking approximately 2 packs per week, she stopped 20 years ago), and diabetes mellitus. The patient had a history of non-insulin-dependent diabetes mellitus, which improved after bariatric gastric sleeve surgery. She has no history of rheumatic fever and does not drink excessive caffeine or alcohol.\par \par BLOOD PRESSURE:\par -BP is controlled on today's exam and she is only on Metoprolol ER 25mg PO DAILY and on Telmisartan HCTZ 80/12.5mg PO DAILY.\par \par -I have discussed the importance of maintaining good BP control and reviewed the newest guidelines with the patient while re-enforcing dietary sodium restrictions to no more than 2-3 g daily, DASH diet, life style modifications as well as the goal of maintaining ideal body weight with the patient today. I have advised the patient to avoid the use of over-the-counter medications/ supplements especially NSAIDS.\par \par I have reviewed with Ms. BARNESE that serious health consequences can occur when blood pressure is not well controlled and the need for strict compliance with medication and that optimal control can significantly reduce the risk of cardiovascular disease stroke, heart attack and other organ damage. They verbally expressed understanding of the fore mentioned serious health consequences to me today.\par \par BLOOD WORK:\par -Blood work done on 11/22/21 demonstrated Cholesterol of 134 and LDL of 57.\par -New blood work was done today, 04/22/2022  to evaluate lipid profile, CBC, BMP, hepatic function, A1C and TSH. \par -Blood work script given to the patient to have blood work done prior to her next follow up appointment. \par \par CHOLESTEROL CONTROL:\par -Patient will continue the advised  TLC diet and to continue follow-up for treatment of hyperlipidemia and repeat blood testing with diet and exercise. I have discussed different exercises and the importance of maintenance of optimal body weight. The importance of staying within guidelines and recommendations was stressed to the patient today and they acknowledged that they understand this to me verbally.\par \par  -Ms. PEACE was educated and advised that failure to follow-up with my medical recommendations to lower cholesterol can result in severe health consequences therefore, they will continuing a low saturated and low fat diet and to avoid excessive carbohydrates to help reduce triglycerides and that lowering LDL levels is associated with a significant decrease in serious cardiac events including but not limited to heart attack stroke and overall death. We will continue lipid lowering agents as advised based on blood test results and the patient understands to call if they develop severe muscle discomfort or if they have a reddish tinted discoloration in their urine.\par \par DIABETIC CONTROL:\par I will advise the patient to keep try to stay on a low carbohydrate diet lose weight and exercise to reduce spikes in their blood sugar.  The importance of monitoring blood sugar regularly and HgBA1c level were reviewed. I have also discussed annual eye exams to prevent blindness,  monitoring urine microalbumin regularly to check for kidney damage and the importance of proper foot care and regularly checking feet to prevent sores and possibly loss of limbs was reviewed. \par \par TESTING/REPORTS:\par -EKG done today 04/22/2022 which demonstrated regular sinus rhythm with nonspecific ST-T wave changes BPM of 80.\par \par -EKG done Sept 10, 2021 which demonstrated regular sinus rhythm with nonspecific ST-T wave changes, BPM of 81.\par \par -EKG done June 17, 2021 demonstrated normal sinus rhythm rate 74 beats per minutes otherwise remarkable for T wave inversion in V1 and V2.\par \par -The patient had an echocardiogram done on 7/15/2021 demonstrated mild mitral, aortic and tricuspid regurgitation, mild left atrial enlargement with normal left ventricular systolic function. \par -EF on echo reported to be 65%\par \par -Pharm Nuc stress test done on 7/19/2021 demonstrated evidence for medium sized, mild to moderate defects in the anterior and anteroseptal walls that are partially reversible consistent with infarction with moderate virginia-infarct ischemia.\par \par -The patient had a cardiac catheterization done August second, 2021 after the patient had an abnormal nuclear stress test July 19, 2021.  The catheterization revealed a normal left main, left anterior descending artery, first diagonal artery, circumflex artery, obtuse marginal artery, and right coronary artery consistent with normal cardiac catheterization.  The nuclear stress test was considered false positive.\par \par PLAN:\par -The patient is clear from a cardiac standpoint. Please avoid overhydration. The patient should be allowed to take their usual medications in the perioperative period. Maintain proper DVT prophylaxis. The patient should have an incentive spirometer in the perioperative period. \par -She will continue with her usual medications and will contact the office if she is having any complaints between now and their next follow up appointment.\par -She will follow up with her PCP.\par -She will follow up in 3 months. \par \par I have discussed the plan of care with Ms. DAVID PEACE. She is compliant with all of her medications.\par \par The patient understands that aerobic exercises must be increased to minutes 4 times/week and a detailed discussion of lifestyle modification was done today. \par The patient has a good understanding of the diagnosis, treatment plan and lifestyle modification. \par She will contact me at the office for any questions with their care or any changes in their health status.\par \par The patient was seen together with supervision physician Dr. Yuriy Yoder and the plan of care was discussed with the patient and will be carried out as noted above.\par \par Christian RICHARDS 
Hpi Title: Evaluation of Skin Lesions
How Severe Are Your Spot(S)?: mild
Have Your Spot(S) Been Treated In The Past?: has not been treated

## 2023-10-25 ENCOUNTER — NON-APPOINTMENT (OUTPATIENT)
Age: 73
End: 2023-10-25

## 2023-10-26 ENCOUNTER — APPOINTMENT (OUTPATIENT)
Dept: CARDIOLOGY | Facility: CLINIC | Age: 73
End: 2023-10-26
Payer: MEDICARE

## 2023-10-26 ENCOUNTER — NON-APPOINTMENT (OUTPATIENT)
Age: 73
End: 2023-10-26

## 2023-10-26 PROCEDURE — 93246 EXT ECG>7D<15D RECORDING: CPT

## 2023-11-20 ENCOUNTER — APPOINTMENT (OUTPATIENT)
Dept: CARDIOLOGY | Facility: CLINIC | Age: 73
End: 2023-11-20
Payer: MEDICARE

## 2023-11-20 DIAGNOSIS — I73.9 PERIPHERAL VASCULAR DISEASE, UNSPECIFIED: ICD-10-CM

## 2023-11-20 PROCEDURE — 93248 EXT ECG>7D<15D REV&INTERPJ: CPT

## 2023-11-30 ENCOUNTER — APPOINTMENT (OUTPATIENT)
Dept: CARDIOLOGY | Facility: CLINIC | Age: 73
End: 2023-11-30
Payer: MEDICARE

## 2023-11-30 VITALS
HEART RATE: 76 BPM | BODY MASS INDEX: 36.88 KG/M2 | OXYGEN SATURATION: 97 % | WEIGHT: 216 LBS | TEMPERATURE: 97.6 F | SYSTOLIC BLOOD PRESSURE: 129 MMHG | HEIGHT: 64 IN | RESPIRATION RATE: 16 BRPM | DIASTOLIC BLOOD PRESSURE: 72 MMHG

## 2023-11-30 PROCEDURE — 99214 OFFICE O/P EST MOD 30 MIN: CPT

## 2023-12-07 PROBLEM — I73.9 INTERMITTENT CLAUDICATION: Status: ACTIVE | Noted: 2021-07-19

## 2024-01-31 RX ORDER — TELMISARTAN AND HYDROCHLOROTHIAZIDE 80; 12.5 MG/1; MG/1
80-12.5 TABLET ORAL DAILY
Qty: 90 | Refills: 1 | Status: ACTIVE | COMMUNITY
Start: 2021-08-03 | End: 1900-01-01

## 2024-02-08 ENCOUNTER — APPOINTMENT (OUTPATIENT)
Dept: ORTHOPEDIC SURGERY | Facility: CLINIC | Age: 74
End: 2024-02-08
Payer: MEDICARE

## 2024-02-08 VITALS — WEIGHT: 215 LBS | BODY MASS INDEX: 36.7 KG/M2 | HEIGHT: 64 IN

## 2024-02-08 PROCEDURE — 73562 X-RAY EXAM OF KNEE 3: CPT | Mod: RT

## 2024-02-08 PROCEDURE — 99214 OFFICE O/P EST MOD 30 MIN: CPT

## 2024-02-13 ENCOUNTER — APPOINTMENT (OUTPATIENT)
Dept: CARDIOLOGY | Facility: CLINIC | Age: 74
End: 2024-02-13

## 2024-02-27 RX ORDER — ATORVASTATIN CALCIUM 20 MG/1
20 TABLET, FILM COATED ORAL
Qty: 90 | Refills: 1 | Status: ACTIVE | COMMUNITY
Start: 2019-12-12 | End: 1900-01-01

## 2024-05-02 ENCOUNTER — LABORATORY RESULT (OUTPATIENT)
Age: 74
End: 2024-05-02

## 2024-05-02 NOTE — PRE-OP CHECKLIST - IDENTIFICATION BAND VERIFIED
Attending Physician Attestation Note:    Resident Physician:  Kelly Dunlap DO    Pt is a 24 year old male here for the following concerns:    TCM for Severe OUD- 14 overdoses since January in our system alone. He was discharged from our service earlier this week and not initiated on suboxone due to ongoing use including concern while inpatient, and unfortunately he is actively under the influence here today, endorsing last use prior to arrival.  Recent labs reviewed.  +fentanyl, THC. He is hemodynamically stable today with normal RR and SaO2 currently.  He did not drive today.  Has had induced withdrawal with suboxone one previously and did not continue it for that reason.  He would like to avoid that again and is aware buprenorphine induction today or likely even tomorrow would induce withdrawal.    Discussed options at length with patient including various levels of support, Suboxone vs. Methadone for MOUD, community resources for recovery which were also provided in AVS.  He declines inpatient / intensive outpatient treatment at this time. He is planning to meet a peer sponsor from [facility] after his visit today and will be meeting him downstairs.  Methadone may be a better option for him to avoid withdrawal and have more support during this initial period of transition into treatment, and he will consider this.  Provided option to f/u in 1 day and patient would like to f/u at [facility] on Monday morning.  We encouraged him to come to visit even if he has still been actively using to continue supporting him and making safe plans together.   He has narcan from discharge      Patient seen and examined with  DO Isabel Blanton DO  5/2/2024     done

## 2024-05-03 ENCOUNTER — APPOINTMENT (OUTPATIENT)
Dept: CARDIOLOGY | Facility: CLINIC | Age: 74
End: 2024-05-03
Payer: MEDICARE

## 2024-05-03 VITALS
HEIGHT: 64 IN | DIASTOLIC BLOOD PRESSURE: 70 MMHG | WEIGHT: 225 LBS | RESPIRATION RATE: 16 BRPM | BODY MASS INDEX: 38.41 KG/M2 | HEART RATE: 72 BPM | SYSTOLIC BLOOD PRESSURE: 140 MMHG | OXYGEN SATURATION: 97 % | TEMPERATURE: 97.2 F

## 2024-05-03 DIAGNOSIS — Z87.898 PERSONAL HISTORY OF OTHER SPECIFIED CONDITIONS: ICD-10-CM

## 2024-05-03 DIAGNOSIS — I10 ESSENTIAL (PRIMARY) HYPERTENSION: ICD-10-CM

## 2024-05-03 DIAGNOSIS — I35.1 NONRHEUMATIC AORTIC (VALVE) INSUFFICIENCY: ICD-10-CM

## 2024-05-03 DIAGNOSIS — I25.10 ATHEROSCLEROTIC HEART DISEASE OF NATIVE CORONARY ARTERY W/OUT ANGINA PECTORIS: ICD-10-CM

## 2024-05-03 DIAGNOSIS — Z86.79 PERSONAL HISTORY OF OTHER DISEASES OF THE CIRCULATORY SYSTEM: ICD-10-CM

## 2024-05-03 DIAGNOSIS — E78.5 HYPERLIPIDEMIA, UNSPECIFIED: ICD-10-CM

## 2024-05-03 PROCEDURE — 93306 TTE W/DOPPLER COMPLETE: CPT

## 2024-05-03 PROCEDURE — 99214 OFFICE O/P EST MOD 30 MIN: CPT

## 2024-05-03 PROCEDURE — G2211 COMPLEX E/M VISIT ADD ON: CPT

## 2024-05-03 RX ORDER — TIRZEPATIDE 2.5 MG/.5ML
2.5 INJECTION, SOLUTION SUBCUTANEOUS
Refills: 0 | Status: COMPLETED | COMMUNITY
Start: 2023-03-06 | End: 2024-05-03

## 2024-05-03 NOTE — DISCUSSION/SUMMARY
[FreeTextEntry1] : Dr. Yoder-(PRIOR VISIT and PMH WITH Dr. Yoder):  This is a 72-year-old female past medical history significant for non-insulin-dependent diabetes mellitus, hypertension, hyperlipidemia, status post bariatric surgery, gastric sleeve in 2018, status post spinal fusion surgery, status post cholecystectomy, nephrolithiasis, status post cataract surgery, who comes in for cardiac follow-up evaluation. The patient had back surgery with a lateral approach. Her cardiac risk factors include hypertension, hyperlipidemia, psoriasis, history of smoking approximately 2 packs per week, she stopped 20 years ago), and diabetes mellitus. The patient had a history of non-insulin-dependent diabetes mellitus, which improved after bariatric gastric sleeve surgery. Lipid panel done February 14, 2023 demonstrated cholesterol 141, triglycerides 108, HDL 58, LDL calculated 63 mg/dL with hemoglobin A1c of 5.1. The patient's LDL is at target. Echo Doppler examination done December 30, 2022 demonstrated minimal mitral valve regurgitation, mild aortic valve regurgitation, minimal tricuspid valve regurgitation, left mild left atrial enlargement, normal left ventricular ejection fraction of 64%. The patient is complaining of bilateral trace pedal edema during the summer which is likely secondary to dietary indiscretion, heat and humidity.  She still has trace bilateral pedal edema today.  I recommended she elevate her feet and limit her salt intake. Electrocardiogram done August 22, 2023 demonstrated normal sinus rhythm rate 66 bpm and is otherwise remarkable for nonspecific ST wave changes.  She has been stable on her current dose of Lipitor 20 mg/day, Micardis hydrochlorothiazide 80/12.5 mg/day, Toprol-XL 25 mg/day, and Lipitor 20 mg/day.  Cardiac catheterization done August second, 2021 after the patient had an abnormal nuclear stress test July 19, 2021.  The catheterization revealed a normal left main, left anterior descending artery, first diagonal artery, circumflex artery, obtuse marginal artery, and right coronary artery consistent with normal cardiac catheterization.  The nuclear stress test was considered false positive.  She was placed on Mounjaro and is up to 12.5 mg weekly for diabetic control with weight loss intent as well. The patient reports she is not losing weight.  I recommended she work with our registered dietitian.  She is motivated to do so and will set up an appointment. Will continue on her current diet.  Her exercise is limited due to her recent back surgery. She will follow up with me in 4 months.  Hopefully with lifestyle modification, caloric reduction and increased aerobic activity a lipid panel will improve.  EKG done June 17, 2021 demonstrated normal sinus rhythm rate 74 beats per minutes otherwise remarkable for T wave inversion in V1 and V2.  The patient understands that aerobic exercises must be increased to 40 minutes 4 times per week. A detailed discussion of lifestyle modification was done today. The patient has a good understanding of the diagnosis, and treatment plan. Lifestyle modification was also outlined.

## 2024-05-03 NOTE — ASSESSMENT
[FreeTextEntry1] : This is a 73-year-old female here today for follow-up cardiac evaluation.  She has a past medical history significant for non-insulin-dependent diabetes mellitus, hypertension, hyperlipidemia, status post bariatric surgery, gastric sleeve in 2018, status post spinal fusion surgery, status post cholecystectomy, nephrolithiasis, status post cataract surgery and s/p spine surgery done on 9/30/2021 by Dr. Joseph at [place].  She has no history of rheumatic fever and does not drink excessive caffeine or alcohol.  Cardiac risk factors include hypertension, hyperlipidemia, psoriasis, history of smoking approximately 2 packs per week, she stopped 20 years ago), and diabetes mellitus. The patient had a history of non-insulin-dependent diabetes mellitus, which improved after bariatric gastric sleeve surgery.    HPI: She is feeling generally well today and denies chest pain, dizziness, heart palpitations, recent episodes of syncope or falls, SOB, or dyspnea at this time.   She sustained a fall from standing in February 2024 and fractured her L1-L2 vertebrae, currently in a TSLO brace that she has to wear for a total of ~4-6 months due to her history of Osteoporosis. She reports increased pain with lying flat and when getting up from laying down/sitting and EKG was not performed today. She is scheduled for an epidural steroid injection in the next 3 weeks that she has been receiving for pain control.   Current Medications: Atorvastatin 20mg PO DAILY, Telmisartan HCTZ 80-12.5mg PO DAILY, and Metoprolol ER 25mg PO DAILY.   BLOOD PRESSURE: -BP is elevated on today's exam. She reports increased back pain at this time that may be contributing to this level.  *Her blood pressure will be repeated at the time of next visit. Lifestyle modification including salt restriction, regular aerobic activity was reinforced.   BLOOD WORK: -New blood work was done 05/03/2024 to evaluate lipid profile, CBC, BMP, hepatic function, A1C and TSH. -Lipid panel done August 2023 demonstrated cholesterol 132, triglycerides 149, HDL 51 LDL, 56, Non-HDL 82, direct LDL 66 -Lipid panel done February 14, 2023 demonstrated cholesterol 141, triglycerides 108, HDL 58, LDL calculated 63 mg/dL with hemoglobin A1c of 5.1. -Blood work done on 11/22/21 demonstrated Cholesterol of 134 and LDL of 57. -New blood work was done today, 04/22/2022 to evaluate lipid profile, CBC, BMP, hepatic function, A1C and TSH. -Blood work script given to the patient to have blood work done prior to her next follow up appointment.   TESTING/REPORTS: -Echocardiogram done today 05/03/2024 in office with results pending.   -Zio monitor completed 10/26/23-11/09/23 demonstrated min HR of 52 bpm, max HR of 164 bpm, and average HR of 71 bpm. Predominant underlying rhythm was sinus rhythm.   -EKG done 10/26/2023 which demonstrated regular sinus rhythm with nonspecific ST-T wave changes BPM of 81.   -Electrocardiogram done August 22, 2023 demonstrated normal sinus rhythm rate 66 bpm and is otherwise remarkable for nonspecific ST wave changes.  -EKG done 03/03/2023 which demonstrated regular sinus rhythm with nonspecific ST-T wave changes BPM of 62 with rate variation. I remind her to stay well-hydrated especially since she is going away on vacation soon.  -Echocardiogram done on 12/30/22 demonstrated minimal mitral valve regurgitation, mild aortic regurgitation, mildly dilated left atrium, mild thickening of the aortic valve leaflets, minimal tricuspid regurgitation with normal left ventricular systolic function ejection fraction 64%  -EKG done 10/14/2022 which demonstrated regular sinus rhythm with nonspecific ST-T wave changes BPM of 71.  -EKG done 04/22/2022 which demonstrated regular sinus rhythm with nonspecific ST-T wave changes BPM of 80.  -EKG done Sept 10, 2021 which demonstrated regular sinus rhythm with nonspecific ST-T wave changes, BPM of 81.  -EKG done June 17, 2021 demonstrated normal sinus rhythm rate 74 beats per minutes otherwise remarkable for T wave inversion in V1 and V2.  -The patient had an echocardiogram done on 7/15/2021 demonstrated mild mitral, aortic and tricuspid regurgitation, mild left atrial enlargement with normal left ventricular systolic function. -EF on echo reported to be 65%  -Pharm Nuc stress test done on 7/19/2021 demonstrated evidence for medium sized, mild to moderate defects in the anterior and anteroseptal walls that are partially reversible consistent with infarction with moderate virginia-infarct ischemia.  -The patient had a cardiac catheterization done August second, 2021 after the patient had an abnormal nuclear stress test July 19, 2021. The catheterization revealed a normal left main, left anterior descending artery, first diagonal artery, circumflex artery, obtuse marginal artery, and right coronary artery consistent with normal cardiac catheterization. The nuclear stress test was considered false positive.   PLAN: -She will continue with her usual medications and will contact the office if she is having any complaints between now and their next follow up appointment. -She will follow up with her primary care doctor Dr. Andre Freed and requests that her results be faxed to his office for her records. (FAX # [phone])  I have discussed the plan of care with DAVID PEACE and she will follow up in 3 months. They are compliant with all of their medications.     The patient understands that aerobic exercises must be increased to 40 minutes 4 times/week and a detailed discussion of lifestyle modification was done today.   The patient has a good understanding of the diagnosis, treatment plan and lifestyle modification.   They will contact me at the office for any questions with their care or any changes in their health status.   The patient was discussed with supervision physician Dr. Yuriy Yoder at the time of the visit and the plan of care will be carried out as noted above.     JOSELITO Christian NP

## 2024-05-03 NOTE — REASON FOR VISIT
[CV Risk Factors and Non-Cardiac Disease] : CV risk factors and non-cardiac disease [Hyperlipidemia] : hyperlipidemia [Hypertension] : hypertension [Coronary Artery Disease] : coronary artery disease [Other: ____] : [unfilled] [FreeTextEntry3] : Dr. Freed [FreeTextEntry1] : This is a 72 year old female here today for follow up cardiac evaluation.  She has a past medical history significant for non-insulin-dependent diabetes mellitus, hypertension, hyperlipidemia, status post bariatric surgery, gastric sleeve in 2018, status post spinal fusion surgery, status post cholecystectomy, nephrolithiasis, status post cataract surgery and s/p spine surgery done on 9/30/2021 by Dr. Joseph at [place]. Pt is generally well without any complaints besides mild intermittent palpitations. Pt denies CP, SOB, falls, syncope. or current palpitations.

## 2024-05-03 NOTE — PHYSICAL EXAM
[Well Developed] : well developed [Well Nourished] : well nourished [No Acute Distress] : no acute distress [Normal Conjunctiva] : normal conjunctiva [Normal Venous Pressure] : normal venous pressure [No Carotid Bruit] : no carotid bruit [Normal S1, S2] : normal S1, S2 [No Rub] : no rub [5th Left ICS - MCL] : palpated at the 5th LICS in the midclavicular line [Normal] : normal [No Precordial Heave] : no precordial heave was noted [Normal Rate] : normal [Rhythm Regular] : regular [Normal S1] : normal S1 [Normal S2] : normal S2 [No Gallop] : no gallop heard [II] : a grade 2 [___ +] : bilateral [unfilled]U+ pitting edema to the ankles [2+] : left 2+ [Clear Lung Fields] : clear lung fields [Good Air Entry] : good air entry [No Respiratory Distress] : no respiratory distress  [Soft] : abdomen soft [Non Tender] : non-tender [No Masses/organomegaly] : no masses/organomegaly [Normal Bowel Sounds] : normal bowel sounds [Normal Gait] : normal gait [No Edema] : no edema [No Cyanosis] : no cyanosis [No Clubbing] : no clubbing [No Varicosities] : no varicosities [No Rash] : no rash [No Skin Lesions] : no skin lesions [Moves all extremities] : moves all extremities [No Focal Deficits] : no focal deficits [Normal Speech] : normal speech [Alert and Oriented] : alert and oriented [Normal memory] : normal memory

## 2024-05-03 NOTE — CARDIOLOGY SUMMARY
[de-identified] : 4/22/22 [de-identified] : 7/16/2021 [de-identified] : 7/15/2021 [de-identified] : GREY: 7/15/2021\par  Carotid: 7/14/2021

## 2024-05-09 ENCOUNTER — APPOINTMENT (OUTPATIENT)
Dept: ORTHOPEDIC SURGERY | Facility: CLINIC | Age: 74
End: 2024-05-09
Payer: MEDICARE

## 2024-05-09 DIAGNOSIS — M25.561 PAIN IN RIGHT KNEE: ICD-10-CM

## 2024-05-09 PROCEDURE — 99214 OFFICE O/P EST MOD 30 MIN: CPT | Mod: 25

## 2024-05-09 PROCEDURE — 20610 DRAIN/INJ JOINT/BURSA W/O US: CPT | Mod: RT

## 2024-05-10 ENCOUNTER — APPOINTMENT (OUTPATIENT)
Dept: FAMILY MEDICINE | Facility: CLINIC | Age: 74
End: 2024-05-10
Payer: MEDICARE

## 2024-05-10 VITALS
HEIGHT: 64 IN | DIASTOLIC BLOOD PRESSURE: 74 MMHG | TEMPERATURE: 97.9 F | OXYGEN SATURATION: 99 % | HEART RATE: 54 BPM | BODY MASS INDEX: 37.22 KG/M2 | SYSTOLIC BLOOD PRESSURE: 149 MMHG | WEIGHT: 218 LBS

## 2024-05-10 DIAGNOSIS — Z80.3 FAMILY HISTORY OF MALIGNANT NEOPLASM OF BREAST: ICD-10-CM

## 2024-05-10 DIAGNOSIS — L40.9 PSORIASIS, UNSPECIFIED: ICD-10-CM

## 2024-05-10 DIAGNOSIS — Z98.84 BARIATRIC SURGERY STATUS: ICD-10-CM

## 2024-05-10 DIAGNOSIS — E11.9 TYPE 2 DIABETES MELLITUS W/OUT COMPLICATIONS: ICD-10-CM

## 2024-05-10 DIAGNOSIS — K21.9 GASTRO-ESOPHAGEAL REFLUX DISEASE W/OUT ESOPHAGITIS: ICD-10-CM

## 2024-05-10 DIAGNOSIS — Z13.820 ENCOUNTER FOR SCREENING FOR OSTEOPOROSIS: ICD-10-CM

## 2024-05-10 DIAGNOSIS — M25.569 PAIN IN UNSPECIFIED KNEE: ICD-10-CM

## 2024-05-10 PROCEDURE — 99204 OFFICE O/P NEW MOD 45 MIN: CPT

## 2024-05-10 RX ORDER — METOPROLOL SUCCINATE 25 MG/1
25 TABLET, EXTENDED RELEASE ORAL
Refills: 0 | Status: ACTIVE | COMMUNITY
Start: 2019-09-30

## 2024-05-10 RX ORDER — PREGABALIN 100 MG/1
100 CAPSULE ORAL 3 TIMES DAILY
Refills: 0 | Status: DISCONTINUED | COMMUNITY
Start: 2022-10-17 | End: 2024-05-10

## 2024-05-10 RX ORDER — PREGABALIN 150 MG/1
150 CAPSULE ORAL 3 TIMES DAILY
Refills: 0 | Status: ACTIVE | COMMUNITY
Start: 2024-05-10

## 2024-05-10 RX ORDER — METFORMIN HYDROCHLORIDE 1000 MG/1
1000 TABLET, COATED ORAL
Qty: 60 | Refills: 1 | Status: ACTIVE | COMMUNITY

## 2024-05-10 RX ORDER — PANTOPRAZOLE 40 MG/1
40 TABLET, DELAYED RELEASE ORAL
Refills: 0 | Status: ACTIVE | COMMUNITY

## 2024-05-10 RX ORDER — GUSELKUMAB 100 MG/ML
100 INJECTION SUBCUTANEOUS
Refills: 0 | Status: ACTIVE | COMMUNITY

## 2024-05-10 NOTE — HEALTH RISK ASSESSMENT
[Yes] : Yes [Monthly or less (1 pt)] : Monthly or less (1 point) [1 or 2 (0 pts)] : 1 or 2 (0 points) [Never (0 pts)] : Never (0 points) [No] : In the past 12 months have you used drugs other than those required for medical reasons? No [Little interest or pleasure doing things] : 1) Little interest or pleasure doing things [Feeling down, depressed, or hopeless] : 2) Feeling down, depressed, or hopeless [0] : 2) Feeling down, depressed, or hopeless: Not at all (0) [PHQ-2 Negative - No further assessment needed] : PHQ-2 Negative - No further assessment needed [Patient reported mammogram was normal] : Patient reported mammogram was normal [Patient reported PAP Smear was normal] : Patient reported PAP Smear was normal [Patient reported bone density results were abnormal] : Patient reported bone density results were abnormal [Patient reported colonoscopy was normal] : Patient reported colonoscopy was normal [Former] : Former [> 15 Years] : > 15 Years [Audit-CScore] : 1 [[id number]] : 0 [PapSmearDate] : 11/22 [MammogramDate] : 12/23 [BoneDensityDate] : 01/22 [BoneDensityComments] : osteoporosis [ColonoscopyDate] : 01/23 [de-identified] : half a pack quit 30 years ago

## 2024-05-10 NOTE — HISTORY OF PRESENT ILLNESS
[de-identified] :  Patient is a 73 year old F with a PMHx of DM2, HTN, HLD, status post bariatric surgery, gastric sleeve in 2018, status post spinal fusion surgery, status post cholecystectomy, nephrolithiasis coming in to establish care.  Patient reports right sided knee pain for the past month.  Patient went orthopedic who suggested pain is MSK in nature.  Patient continues to follow neurosurgery and pain management for chronic lower back pain.  Patient reports history of osteoporosis but only taking vitamin D and calcium.  Patient currently denies chest pain, SOB, nausea, vomiting, or fever at this time.

## 2024-05-11 LAB
25(OH)D3 SERPL-MCNC: 47.1 NG/ML
ALBUMIN SERPL ELPH-MCNC: 4.4 G/DL
ALP BLD-CCNC: 105 U/L
ALT SERPL-CCNC: 17 U/L
ANION GAP SERPL CALC-SCNC: 11 MMOL/L
AST SERPL-CCNC: 13 U/L
BILIRUB SERPL-MCNC: 0.6 MG/DL
BUN SERPL-MCNC: 33 MG/DL
CALCIUM SERPL-MCNC: 10.6 MG/DL
CHLORIDE SERPL-SCNC: 100 MMOL/L
CHOLEST SERPL-MCNC: 167 MG/DL
CO2 SERPL-SCNC: 29 MMOL/L
CREAT SERPL-MCNC: 0.8 MG/DL
EGFR: 78 ML/MIN/1.73M2
ESTIMATED AVERAGE GLUCOSE: 114 MG/DL
FOLATE SERPL-MCNC: >20 NG/ML
GLUCOSE SERPL-MCNC: 162 MG/DL
HBA1C MFR BLD HPLC: 5.6 %
HDLC SERPL-MCNC: 69 MG/DL
LDLC SERPL CALC-MCNC: 75 MG/DL
NONHDLC SERPL-MCNC: 99 MG/DL
POTASSIUM SERPL-SCNC: 5.2 MMOL/L
PROT SERPL-MCNC: 7.1 G/DL
SODIUM SERPL-SCNC: 140 MMOL/L
TRIGL SERPL-MCNC: 141 MG/DL
VIT B12 SERPL-MCNC: 911 PG/ML

## 2024-05-14 ENCOUNTER — APPOINTMENT (OUTPATIENT)
Dept: ORTHOPEDIC SURGERY | Facility: CLINIC | Age: 74
End: 2024-05-14

## 2024-06-20 ENCOUNTER — OUTPATIENT (OUTPATIENT)
Dept: OUTPATIENT SERVICES | Facility: HOSPITAL | Age: 74
LOS: 1 days | End: 2024-06-20
Payer: MEDICARE

## 2024-06-20 ENCOUNTER — APPOINTMENT (OUTPATIENT)
Dept: RADIOLOGY | Facility: IMAGING CENTER | Age: 74
End: 2024-06-20
Payer: MEDICARE

## 2024-06-20 DIAGNOSIS — H26.9 UNSPECIFIED CATARACT: Chronic | ICD-10-CM

## 2024-06-20 DIAGNOSIS — Z90.3 ACQUIRED ABSENCE OF STOMACH [PART OF]: Chronic | ICD-10-CM

## 2024-06-20 DIAGNOSIS — Z13.820 ENCOUNTER FOR SCREENING FOR OSTEOPOROSIS: ICD-10-CM

## 2024-06-20 DIAGNOSIS — Z98.1 ARTHRODESIS STATUS: Chronic | ICD-10-CM

## 2024-06-20 PROCEDURE — 77080 DXA BONE DENSITY AXIAL: CPT

## 2024-06-20 PROCEDURE — 77080 DXA BONE DENSITY AXIAL: CPT | Mod: 26

## 2024-06-26 ENCOUNTER — APPOINTMENT (OUTPATIENT)
Dept: ORTHOPEDIC SURGERY | Facility: CLINIC | Age: 74
End: 2024-06-26
Payer: MEDICARE

## 2024-06-26 DIAGNOSIS — M17.11 UNILATERAL PRIMARY OSTEOARTHRITIS, RIGHT KNEE: ICD-10-CM

## 2024-06-26 PROCEDURE — 73562 X-RAY EXAM OF KNEE 3: CPT | Mod: RT

## 2024-06-26 PROCEDURE — 99214 OFFICE O/P EST MOD 30 MIN: CPT

## 2024-06-28 ENCOUNTER — APPOINTMENT (OUTPATIENT)
Dept: MRI IMAGING | Facility: HOSPITAL | Age: 74
End: 2024-06-28

## 2024-06-28 ENCOUNTER — OUTPATIENT (OUTPATIENT)
Dept: OUTPATIENT SERVICES | Facility: HOSPITAL | Age: 74
LOS: 1 days | End: 2024-06-28
Payer: MEDICARE

## 2024-06-28 DIAGNOSIS — H26.9 UNSPECIFIED CATARACT: Chronic | ICD-10-CM

## 2024-06-28 DIAGNOSIS — Z98.1 ARTHRODESIS STATUS: Chronic | ICD-10-CM

## 2024-06-28 DIAGNOSIS — M25.561 PAIN IN RIGHT KNEE: ICD-10-CM

## 2024-06-28 PROCEDURE — 73721 MRI JNT OF LWR EXTRE W/O DYE: CPT | Mod: 26,RT

## 2024-06-28 PROCEDURE — 73721 MRI JNT OF LWR EXTRE W/O DYE: CPT

## 2024-07-05 ENCOUNTER — APPOINTMENT (OUTPATIENT)
Dept: FAMILY MEDICINE | Facility: CLINIC | Age: 74
End: 2024-07-05
Payer: MEDICARE

## 2024-07-05 VITALS
WEIGHT: 227 LBS | RESPIRATION RATE: 16 BRPM | TEMPERATURE: 97.6 F | OXYGEN SATURATION: 94 % | HEART RATE: 79 BPM | DIASTOLIC BLOOD PRESSURE: 63 MMHG | SYSTOLIC BLOOD PRESSURE: 150 MMHG | HEIGHT: 64 IN | BODY MASS INDEX: 38.76 KG/M2

## 2024-07-05 DIAGNOSIS — I87.2 VENOUS INSUFFICIENCY (CHRONIC) (PERIPHERAL): ICD-10-CM

## 2024-07-05 DIAGNOSIS — I10 ESSENTIAL (PRIMARY) HYPERTENSION: ICD-10-CM

## 2024-07-05 DIAGNOSIS — M79.89 OTHER SPECIFIED SOFT TISSUE DISORDERS: ICD-10-CM

## 2024-07-05 PROCEDURE — G2211 COMPLEX E/M VISIT ADD ON: CPT

## 2024-07-05 PROCEDURE — 99213 OFFICE O/P EST LOW 20 MIN: CPT

## 2024-07-05 RX ORDER — TIRZEPATIDE 15 MG/.5ML
15 INJECTION, SOLUTION SUBCUTANEOUS
Refills: 0 | Status: ACTIVE | COMMUNITY

## 2024-07-07 NOTE — ASU PATIENT PROFILE, ADULT - AS SC BRADEN MOBILITY
(3) slightly limited
36.4
You have been enrolled in [facility].  will be assigned and they will notify you after you leave the hospital.  Ph. [phone]

## 2024-07-11 ENCOUNTER — APPOINTMENT (OUTPATIENT)
Dept: ORTHOPEDIC SURGERY | Facility: CLINIC | Age: 74
End: 2024-07-11
Payer: MEDICARE

## 2024-07-11 VITALS — WEIGHT: 225 LBS | BODY MASS INDEX: 38.41 KG/M2 | HEIGHT: 64 IN

## 2024-07-11 DIAGNOSIS — M17.11 UNILATERAL PRIMARY OSTEOARTHRITIS, RIGHT KNEE: ICD-10-CM

## 2024-07-11 PROCEDURE — 20610 DRAIN/INJ JOINT/BURSA W/O US: CPT | Mod: RT

## 2024-07-11 PROCEDURE — 99214 OFFICE O/P EST MOD 30 MIN: CPT | Mod: 25

## 2024-08-16 ENCOUNTER — APPOINTMENT (OUTPATIENT)
Dept: FAMILY MEDICINE | Facility: CLINIC | Age: 74
End: 2024-08-16
Payer: MEDICARE

## 2024-08-16 VITALS
WEIGHT: 222 LBS | HEIGHT: 64 IN | DIASTOLIC BLOOD PRESSURE: 75 MMHG | HEART RATE: 77 BPM | TEMPERATURE: 97.3 F | OXYGEN SATURATION: 96 % | SYSTOLIC BLOOD PRESSURE: 151 MMHG | BODY MASS INDEX: 37.9 KG/M2

## 2024-08-16 DIAGNOSIS — J32.9 CHRONIC SINUSITIS, UNSPECIFIED: ICD-10-CM

## 2024-08-16 DIAGNOSIS — M25.559 PAIN IN UNSPECIFIED HIP: ICD-10-CM

## 2024-08-16 DIAGNOSIS — G89.29 PAIN IN UNSPECIFIED HIP: ICD-10-CM

## 2024-08-16 DIAGNOSIS — E11.9 TYPE 2 DIABETES MELLITUS W/OUT COMPLICATIONS: ICD-10-CM

## 2024-08-16 PROCEDURE — 99214 OFFICE O/P EST MOD 30 MIN: CPT

## 2024-08-16 PROCEDURE — G2211 COMPLEX E/M VISIT ADD ON: CPT

## 2024-08-16 RX ORDER — AZELASTINE HYDROCHLORIDE 137 UG/1
137 SPRAY, METERED NASAL
Qty: 1 | Refills: 2 | Status: ACTIVE | COMMUNITY
Start: 2024-08-16 | End: 1900-01-01

## 2024-08-16 RX ORDER — AMOXICILLIN AND CLAVULANATE POTASSIUM 875; 125 MG/1; MG/1
875-125 TABLET, COATED ORAL
Qty: 10 | Refills: 0 | Status: ACTIVE | COMMUNITY
Start: 2024-08-16 | End: 1900-01-01

## 2024-08-16 NOTE — HISTORY OF PRESENT ILLNESS
[de-identified] :  Patient is a 73 year old F with a PMHx of DM2, HTN, HLD, status post bariatric surgery, gastric sleeve in 2018, status post spinal fusion surgery, status post cholecystectomy, nephrolithiasis coming in for follow up.  Patient reports hip pain on the right for past couple years that occurs intermittently sometimes with activity.  Patient reports nasal pain and irritation on the left nostril that occurred two weeks ago.  Patient endorses some sinus pressure as well.

## 2024-08-17 LAB
ALBUMIN SERPL ELPH-MCNC: 4.2 G/DL
ALP BLD-CCNC: 91 U/L
ALT SERPL-CCNC: 13 U/L
ANION GAP SERPL CALC-SCNC: 12 MMOL/L
AST SERPL-CCNC: 15 U/L
BILIRUB SERPL-MCNC: 0.6 MG/DL
BUN SERPL-MCNC: 24 MG/DL
CALCIUM SERPL-MCNC: 10.1 MG/DL
CHLORIDE SERPL-SCNC: 104 MMOL/L
CO2 SERPL-SCNC: 27 MMOL/L
CREAT SERPL-MCNC: 0.94 MG/DL
CREAT SPEC-SCNC: 84 MG/DL
EGFR: 64 ML/MIN/1.73M2
ESTIMATED AVERAGE GLUCOSE: 105 MG/DL
GLUCOSE SERPL-MCNC: 156 MG/DL
HBA1C MFR BLD HPLC: 5.3 %
MICROALBUMIN 24H UR DL<=1MG/L-MCNC: 6.2 MG/DL
MICROALBUMIN/CREAT 24H UR-RTO: 74 MG/G
POTASSIUM SERPL-SCNC: 4.5 MMOL/L
PROT SERPL-MCNC: 6.8 G/DL
SODIUM SERPL-SCNC: 144 MMOL/L

## 2024-08-23 DIAGNOSIS — Z11.1 ENCOUNTER FOR SCREENING FOR RESPIRATORY TUBERCULOSIS: ICD-10-CM

## 2024-08-27 PROBLEM — Z11.1 SCREENING-PULMONARY TB: Status: ACTIVE | Noted: 2024-08-27

## 2024-09-10 ENCOUNTER — APPOINTMENT (OUTPATIENT)
Dept: ORTHOPEDIC SURGERY | Facility: CLINIC | Age: 74
End: 2024-09-10
Payer: MEDICARE

## 2024-09-10 VITALS — HEIGHT: 64 IN | WEIGHT: 222 LBS | BODY MASS INDEX: 37.9 KG/M2

## 2024-09-10 DIAGNOSIS — M16.11 UNILATERAL PRIMARY OSTEOARTHRITIS, RIGHT HIP: ICD-10-CM

## 2024-09-10 PROCEDURE — 99214 OFFICE O/P EST MOD 30 MIN: CPT

## 2024-09-10 PROCEDURE — 72170 X-RAY EXAM OF PELVIS: CPT

## 2024-09-10 RX ORDER — HYALURONATE SODIUM, STABILIZED 60 MG/3 ML
60 SYRINGE (ML) INTRAARTICULAR
Qty: 1 | Refills: 0 | Status: ACTIVE | COMMUNITY
Start: 2024-09-10

## 2024-10-03 ENCOUNTER — APPOINTMENT (OUTPATIENT)
Dept: ORTHOPEDIC SURGERY | Facility: CLINIC | Age: 74
End: 2024-10-03
Payer: MEDICARE

## 2024-10-03 VITALS — WEIGHT: 212 LBS | BODY MASS INDEX: 36.19 KG/M2 | HEIGHT: 64 IN

## 2024-10-03 DIAGNOSIS — M17.11 UNILATERAL PRIMARY OSTEOARTHRITIS, RIGHT KNEE: ICD-10-CM

## 2024-10-03 PROCEDURE — 20610 DRAIN/INJ JOINT/BURSA W/O US: CPT | Mod: RT

## 2024-10-04 ENCOUNTER — LABORATORY RESULT (OUTPATIENT)
Age: 74
End: 2024-10-04

## 2024-10-04 ENCOUNTER — APPOINTMENT (OUTPATIENT)
Dept: FAMILY MEDICINE | Facility: CLINIC | Age: 74
End: 2024-10-04
Payer: MEDICARE

## 2024-10-04 VITALS
RESPIRATION RATE: 16 BRPM | BODY MASS INDEX: 37.05 KG/M2 | HEART RATE: 68 BPM | OXYGEN SATURATION: 96 % | DIASTOLIC BLOOD PRESSURE: 75 MMHG | SYSTOLIC BLOOD PRESSURE: 133 MMHG | TEMPERATURE: 97.4 F | HEIGHT: 64 IN | WEIGHT: 217 LBS

## 2024-10-04 DIAGNOSIS — E11.9 TYPE 2 DIABETES MELLITUS W/OUT COMPLICATIONS: ICD-10-CM

## 2024-10-04 DIAGNOSIS — R39.9 UNSPECIFIED SYMPTOMS AND SIGNS INVOLVING THE GENITOURINARY SYSTEM: ICD-10-CM

## 2024-10-04 DIAGNOSIS — R80.9 PROTEINURIA, UNSPECIFIED: ICD-10-CM

## 2024-10-04 PROCEDURE — 99213 OFFICE O/P EST LOW 20 MIN: CPT

## 2024-10-04 PROCEDURE — G2211 COMPLEX E/M VISIT ADD ON: CPT

## 2024-10-04 NOTE — HISTORY OF PRESENT ILLNESS
[de-identified] :  Patient is a 73 year old F with a PMHx of DM2, HTN, HLD, status post bariatric surgery, gastric sleeve in 2018, status post spinal fusion surgery, status post cholecystectomy, nephrolithiasis coming in for follow up.  Patient reports that she was in hospital two weeks ago for UTI with AMS and possible sepsis.  Patient was admitted for 4 days and given IV abx with improvement of mentation.  Patient reports that she following up with urologist as well.

## 2024-10-05 LAB
ALBUMIN SERPL ELPH-MCNC: 4.1 G/DL
ALP BLD-CCNC: 96 U/L
ALT SERPL-CCNC: 17 U/L
ANION GAP SERPL CALC-SCNC: 13 MMOL/L
APPEARANCE: CLEAR
AST SERPL-CCNC: 15 U/L
BILIRUB SERPL-MCNC: 0.5 MG/DL
BILIRUBIN URINE: NEGATIVE
BLOOD URINE: ABNORMAL
BUN SERPL-MCNC: 24 MG/DL
CALCIUM SERPL-MCNC: 9.8 MG/DL
CHLORIDE SERPL-SCNC: 102 MMOL/L
CHOLEST SERPL-MCNC: 151 MG/DL
CO2 SERPL-SCNC: 27 MMOL/L
COLOR: YELLOW
CREAT SERPL-MCNC: 0.85 MG/DL
EGFR: 72 ML/MIN/1.73M2
ESTIMATED AVERAGE GLUCOSE: 108 MG/DL
GLUCOSE QUALITATIVE U: NEGATIVE MG/DL
GLUCOSE SERPL-MCNC: 93 MG/DL
HBA1C MFR BLD HPLC: 5.4 %
HDLC SERPL-MCNC: 52 MG/DL
KETONES URINE: NEGATIVE MG/DL
LDLC SERPL CALC-MCNC: 67 MG/DL
LEUKOCYTE ESTERASE URINE: ABNORMAL
NITRITE URINE: NEGATIVE
NONHDLC SERPL-MCNC: 99 MG/DL
PH URINE: 6
POTASSIUM SERPL-SCNC: 4.9 MMOL/L
PROT SERPL-MCNC: 7 G/DL
PROTEIN URINE: NORMAL MG/DL
SODIUM SERPL-SCNC: 142 MMOL/L
SPECIFIC GRAVITY URINE: 1.02
TRIGL SERPL-MCNC: 190 MG/DL
UROBILINOGEN URINE: 0.2 MG/DL

## 2024-10-05 RX ORDER — SULFAMETHOXAZOLE AND TRIMETHOPRIM 800; 160 MG/1; MG/1
800-160 TABLET ORAL TWICE DAILY
Qty: 6 | Refills: 0 | Status: ACTIVE | COMMUNITY
Start: 2024-10-05 | End: 1900-01-01

## 2024-10-09 LAB
CREAT SPEC-SCNC: 97 MG/DL
MICROALBUMIN 24H UR DL<=1MG/L-MCNC: 3.4 MG/DL
MICROALBUMIN/CREAT 24H UR-RTO: 35 MG/G

## 2024-10-11 DIAGNOSIS — Z16.12 URINARY TRACT INFECTION, SITE NOT SPECIFIED: ICD-10-CM

## 2024-10-11 DIAGNOSIS — N39.0 URINARY TRACT INFECTION, SITE NOT SPECIFIED: ICD-10-CM

## 2024-10-11 DIAGNOSIS — B96.29 URINARY TRACT INFECTION, SITE NOT SPECIFIED: ICD-10-CM

## 2024-10-16 RX ORDER — PIPERACILLIN SODIUM AND TAZOBACTAM SODIUM 3; .375 G/15ML; G/15ML
3.375 (3-0.375) INJECTION, POWDER, FOR SOLUTION INTRAVENOUS
Qty: 20 | Refills: 0 | Status: ACTIVE | COMMUNITY
Start: 2024-10-11 | End: 1900-01-01

## 2024-10-17 ENCOUNTER — APPOINTMENT (OUTPATIENT)
Dept: CARDIOLOGY | Facility: CLINIC | Age: 74
End: 2024-10-17
Payer: MEDICARE

## 2024-10-17 ENCOUNTER — NON-APPOINTMENT (OUTPATIENT)
Age: 74
End: 2024-10-17

## 2024-10-17 VITALS
HEIGHT: 64 IN | BODY MASS INDEX: 37.56 KG/M2 | SYSTOLIC BLOOD PRESSURE: 122 MMHG | WEIGHT: 220 LBS | DIASTOLIC BLOOD PRESSURE: 62 MMHG | RESPIRATION RATE: 16 BRPM | OXYGEN SATURATION: 96 % | HEART RATE: 69 BPM | TEMPERATURE: 97.6 F

## 2024-10-17 DIAGNOSIS — E78.5 HYPERLIPIDEMIA, UNSPECIFIED: ICD-10-CM

## 2024-10-17 DIAGNOSIS — E11.9 TYPE 2 DIABETES MELLITUS W/OUT COMPLICATIONS: ICD-10-CM

## 2024-10-17 DIAGNOSIS — I35.1 NONRHEUMATIC AORTIC (VALVE) INSUFFICIENCY: ICD-10-CM

## 2024-10-17 DIAGNOSIS — I10 ESSENTIAL (PRIMARY) HYPERTENSION: ICD-10-CM

## 2024-10-17 DIAGNOSIS — I25.10 ATHEROSCLEROTIC HEART DISEASE OF NATIVE CORONARY ARTERY W/OUT ANGINA PECTORIS: ICD-10-CM

## 2024-10-17 PROCEDURE — 93000 ELECTROCARDIOGRAM COMPLETE: CPT

## 2024-10-17 PROCEDURE — 99214 OFFICE O/P EST MOD 30 MIN: CPT

## 2024-10-17 PROCEDURE — G2211 COMPLEX E/M VISIT ADD ON: CPT

## 2024-10-17 RX ORDER — METHENAMINE HIPPURATE 1 G/1
1 TABLET ORAL
Refills: 0 | Status: ACTIVE | COMMUNITY
Start: 2024-10-17

## 2024-10-29 ENCOUNTER — APPOINTMENT (OUTPATIENT)
Dept: FAMILY MEDICINE | Facility: CLINIC | Age: 74
End: 2024-10-29

## 2024-10-31 ENCOUNTER — NON-APPOINTMENT (OUTPATIENT)
Age: 74
End: 2024-10-31

## 2024-11-07 ENCOUNTER — APPOINTMENT (OUTPATIENT)
Dept: ORTHOPEDIC SURGERY | Facility: CLINIC | Age: 74
End: 2024-11-07
Payer: MEDICARE

## 2024-11-07 VITALS
HEART RATE: 75 BPM | WEIGHT: 220 LBS | DIASTOLIC BLOOD PRESSURE: 62 MMHG | HEIGHT: 64 IN | SYSTOLIC BLOOD PRESSURE: 131 MMHG | BODY MASS INDEX: 37.56 KG/M2

## 2024-11-07 DIAGNOSIS — M51.369: ICD-10-CM

## 2024-11-07 DIAGNOSIS — M79.10 MYALGIA, UNSPECIFIED SITE: ICD-10-CM

## 2024-11-07 DIAGNOSIS — M60.9 MYOSITIS, UNSPECIFIED: ICD-10-CM

## 2024-11-07 PROCEDURE — 99214 OFFICE O/P EST MOD 30 MIN: CPT | Mod: 25

## 2024-11-07 PROCEDURE — 20552 NJX 1/MLT TRIGGER POINT 1/2: CPT

## 2024-12-05 ENCOUNTER — APPOINTMENT (OUTPATIENT)
Dept: ORTHOPEDIC SURGERY | Facility: CLINIC | Age: 74
End: 2024-12-05
Payer: MEDICARE

## 2024-12-05 VITALS — HEIGHT: 64 IN | WEIGHT: 212 LBS | BODY MASS INDEX: 36.19 KG/M2

## 2024-12-05 DIAGNOSIS — M16.11 UNILATERAL PRIMARY OSTEOARTHRITIS, RIGHT HIP: ICD-10-CM

## 2024-12-05 DIAGNOSIS — M17.11 UNILATERAL PRIMARY OSTEOARTHRITIS, RIGHT KNEE: ICD-10-CM

## 2024-12-05 PROCEDURE — 73502 X-RAY EXAM HIP UNI 2-3 VIEWS: CPT | Mod: RT

## 2024-12-05 PROCEDURE — 73562 X-RAY EXAM OF KNEE 3: CPT | Mod: RT

## 2024-12-05 PROCEDURE — 99215 OFFICE O/P EST HI 40 MIN: CPT

## 2024-12-10 ENCOUNTER — NON-APPOINTMENT (OUTPATIENT)
Age: 74
End: 2024-12-10

## 2024-12-17 ENCOUNTER — LABORATORY RESULT (OUTPATIENT)
Age: 74
End: 2024-12-17

## 2024-12-17 ENCOUNTER — APPOINTMENT (OUTPATIENT)
Dept: FAMILY MEDICINE | Facility: CLINIC | Age: 74
End: 2024-12-17
Payer: MEDICARE

## 2024-12-17 VITALS
BODY MASS INDEX: 36.7 KG/M2 | HEIGHT: 64 IN | OXYGEN SATURATION: 96 % | DIASTOLIC BLOOD PRESSURE: 73 MMHG | SYSTOLIC BLOOD PRESSURE: 148 MMHG | HEART RATE: 65 BPM | WEIGHT: 215 LBS | TEMPERATURE: 97.3 F

## 2024-12-17 DIAGNOSIS — Z01.818 ENCOUNTER FOR OTHER PREPROCEDURAL EXAMINATION: ICD-10-CM

## 2024-12-17 DIAGNOSIS — M17.11 UNILATERAL PRIMARY OSTEOARTHRITIS, RIGHT KNEE: ICD-10-CM

## 2024-12-17 DIAGNOSIS — I10 ESSENTIAL (PRIMARY) HYPERTENSION: ICD-10-CM

## 2024-12-17 PROCEDURE — G2211 COMPLEX E/M VISIT ADD ON: CPT

## 2024-12-17 PROCEDURE — 99213 OFFICE O/P EST LOW 20 MIN: CPT

## 2024-12-18 LAB
ANION GAP SERPL CALC-SCNC: 12 MMOL/L
APPEARANCE: ABNORMAL
APTT BLD: 29.7 SEC
BILIRUBIN URINE: NEGATIVE
BLOOD URINE: ABNORMAL
BUN SERPL-MCNC: 32 MG/DL
CALCIUM SERPL-MCNC: 10.4 MG/DL
CHLORIDE SERPL-SCNC: 102 MMOL/L
CO2 SERPL-SCNC: 27 MMOL/L
COLOR: YELLOW
CREAT SERPL-MCNC: 0.91 MG/DL
EGFR: 67 ML/MIN/1.73M2
GLUCOSE QUALITATIVE U: NEGATIVE MG/DL
GLUCOSE SERPL-MCNC: 120 MG/DL
HCT VFR BLD CALC: 39.1 %
HGB BLD-MCNC: 11.3 G/DL
INR PPP: 0.96 RATIO
KETONES URINE: NEGATIVE MG/DL
LEUKOCYTE ESTERASE URINE: ABNORMAL
MCHC RBC-ENTMCNC: 28.3 PG
MCHC RBC-ENTMCNC: 28.9 G/DL
MCV RBC AUTO: 98 FL
NITRITE URINE: POSITIVE
PH URINE: 8
PLATELET # BLD AUTO: 228 K/UL
POTASSIUM SERPL-SCNC: 4.6 MMOL/L
PROTEIN URINE: 30 MG/DL
PT BLD: 11.4 SEC
RBC # BLD: 3.99 M/UL
RBC # FLD: 15.1 %
SODIUM SERPL-SCNC: 141 MMOL/L
SPECIFIC GRAVITY URINE: 1.02
UROBILINOGEN URINE: 0.2 MG/DL
WBC # FLD AUTO: 11.45 K/UL

## 2025-01-04 ENCOUNTER — OUTPATIENT (OUTPATIENT)
Dept: OUTPATIENT SERVICES | Facility: HOSPITAL | Age: 75
LOS: 1 days | End: 2025-01-04
Payer: MEDICARE

## 2025-01-04 ENCOUNTER — APPOINTMENT (OUTPATIENT)
Dept: CT IMAGING | Facility: CLINIC | Age: 75
End: 2025-01-04
Payer: MEDICARE

## 2025-01-04 ENCOUNTER — APPOINTMENT (OUTPATIENT)
Dept: FAMILY MEDICINE | Facility: CLINIC | Age: 75
End: 2025-01-04

## 2025-01-04 DIAGNOSIS — Z90.3 ACQUIRED ABSENCE OF STOMACH [PART OF]: Chronic | ICD-10-CM

## 2025-01-04 DIAGNOSIS — M17.11 UNILATERAL PRIMARY OSTEOARTHRITIS, RIGHT KNEE: ICD-10-CM

## 2025-01-04 DIAGNOSIS — Z98.1 ARTHRODESIS STATUS: Chronic | ICD-10-CM

## 2025-01-04 DIAGNOSIS — Z00.8 ENCOUNTER FOR OTHER GENERAL EXAMINATION: ICD-10-CM

## 2025-01-04 DIAGNOSIS — H26.9 UNSPECIFIED CATARACT: Chronic | ICD-10-CM

## 2025-01-04 PROCEDURE — 73700 CT LOWER EXTREMITY W/O DYE: CPT | Mod: 26,RT

## 2025-01-04 PROCEDURE — 73700 CT LOWER EXTREMITY W/O DYE: CPT

## 2025-01-06 ENCOUNTER — OUTPATIENT (OUTPATIENT)
Dept: OUTPATIENT SERVICES | Facility: HOSPITAL | Age: 75
LOS: 1 days | End: 2025-01-06

## 2025-01-06 VITALS
RESPIRATION RATE: 16 BRPM | OXYGEN SATURATION: 97 % | HEART RATE: 75 BPM | WEIGHT: 218.04 LBS | SYSTOLIC BLOOD PRESSURE: 129 MMHG | DIASTOLIC BLOOD PRESSURE: 76 MMHG | TEMPERATURE: 98 F | HEIGHT: 63 IN

## 2025-01-06 DIAGNOSIS — M17.11 UNILATERAL PRIMARY OSTEOARTHRITIS, RIGHT KNEE: ICD-10-CM

## 2025-01-06 DIAGNOSIS — L40.9 PSORIASIS, UNSPECIFIED: ICD-10-CM

## 2025-01-06 DIAGNOSIS — I10 ESSENTIAL (PRIMARY) HYPERTENSION: ICD-10-CM

## 2025-01-06 DIAGNOSIS — E11.9 TYPE 2 DIABETES MELLITUS WITHOUT COMPLICATIONS: ICD-10-CM

## 2025-01-06 DIAGNOSIS — H26.9 UNSPECIFIED CATARACT: Chronic | ICD-10-CM

## 2025-01-06 DIAGNOSIS — Z98.1 ARTHRODESIS STATUS: Chronic | ICD-10-CM

## 2025-01-06 DIAGNOSIS — Z90.3 ACQUIRED ABSENCE OF STOMACH [PART OF]: Chronic | ICD-10-CM

## 2025-01-06 LAB
A1C WITH ESTIMATED AVERAGE GLUCOSE RESULT: 5.1 % — SIGNIFICANT CHANGE UP (ref 4–5.6)
ALBUMIN SERPL ELPH-MCNC: 4.1 G/DL — SIGNIFICANT CHANGE UP (ref 3.3–5)
ALP SERPL-CCNC: 91 U/L — SIGNIFICANT CHANGE UP (ref 40–120)
ALT FLD-CCNC: 13 U/L — SIGNIFICANT CHANGE UP (ref 4–33)
ANION GAP SERPL CALC-SCNC: 11 MMOL/L — SIGNIFICANT CHANGE UP (ref 7–14)
APPEARANCE UR: CLEAR — SIGNIFICANT CHANGE UP
AST SERPL-CCNC: 13 U/L — SIGNIFICANT CHANGE UP (ref 4–32)
BACTERIA # UR AUTO: ABNORMAL /HPF
BASOPHILS # BLD AUTO: 0.06 K/UL — SIGNIFICANT CHANGE UP (ref 0–0.2)
BASOPHILS NFR BLD AUTO: 0.7 % — SIGNIFICANT CHANGE UP (ref 0–2)
BILIRUB SERPL-MCNC: 0.4 MG/DL — SIGNIFICANT CHANGE UP (ref 0.2–1.2)
BILIRUB UR-MCNC: NEGATIVE — SIGNIFICANT CHANGE UP
BUN SERPL-MCNC: 26 MG/DL — HIGH (ref 7–23)
CALCIUM SERPL-MCNC: 9.8 MG/DL — SIGNIFICANT CHANGE UP (ref 8.4–10.5)
CAST: 0 /LPF — SIGNIFICANT CHANGE UP (ref 0–4)
CHLORIDE SERPL-SCNC: 99 MMOL/L — SIGNIFICANT CHANGE UP (ref 98–107)
CO2 SERPL-SCNC: 29 MMOL/L — SIGNIFICANT CHANGE UP (ref 22–31)
COLOR SPEC: YELLOW — SIGNIFICANT CHANGE UP
CREAT SERPL-MCNC: 0.78 MG/DL — SIGNIFICANT CHANGE UP (ref 0.5–1.3)
CRP SERPL-MCNC: 6 MG/L — HIGH
DIFF PNL FLD: ABNORMAL
EGFR: 80 ML/MIN/1.73M2 — SIGNIFICANT CHANGE UP
EOSINOPHIL # BLD AUTO: 0.12 K/UL — SIGNIFICANT CHANGE UP (ref 0–0.5)
EOSINOPHIL NFR BLD AUTO: 1.3 % — SIGNIFICANT CHANGE UP (ref 0–6)
ESTIMATED AVERAGE GLUCOSE: 100 — SIGNIFICANT CHANGE UP
FERRITIN SERPL-MCNC: 120 NG/ML — SIGNIFICANT CHANGE UP (ref 13–330)
FOLATE SERPL-MCNC: 17.4 NG/ML — SIGNIFICANT CHANGE UP
GLUCOSE SERPL-MCNC: 103 MG/DL — HIGH (ref 70–99)
GLUCOSE UR QL: NEGATIVE MG/DL — SIGNIFICANT CHANGE UP
HCT VFR BLD CALC: 36 % — SIGNIFICANT CHANGE UP (ref 34.5–45)
HGB BLD-MCNC: 11.1 G/DL — LOW (ref 11.5–15.5)
IMM GRANULOCYTES NFR BLD AUTO: 0.4 % — SIGNIFICANT CHANGE UP (ref 0–0.9)
IRON SATN MFR SERPL: 16 % — SIGNIFICANT CHANGE UP (ref 14–50)
IRON SATN MFR SERPL: 51 UG/DL — SIGNIFICANT CHANGE UP (ref 30–160)
KETONES UR-MCNC: NEGATIVE MG/DL — SIGNIFICANT CHANGE UP
LEUKOCYTE ESTERASE UR-ACNC: ABNORMAL
LYMPHOCYTES # BLD AUTO: 1.89 K/UL — SIGNIFICANT CHANGE UP (ref 1–3.3)
LYMPHOCYTES # BLD AUTO: 21.2 % — SIGNIFICANT CHANGE UP (ref 13–44)
MCHC RBC-ENTMCNC: 28.9 PG — SIGNIFICANT CHANGE UP (ref 27–34)
MCHC RBC-ENTMCNC: 30.8 G/DL — LOW (ref 32–36)
MCV RBC AUTO: 93.8 FL — SIGNIFICANT CHANGE UP (ref 80–100)
MONOCYTES # BLD AUTO: 0.47 K/UL — SIGNIFICANT CHANGE UP (ref 0–0.9)
MONOCYTES NFR BLD AUTO: 5.3 % — SIGNIFICANT CHANGE UP (ref 2–14)
NEUTROPHILS # BLD AUTO: 6.33 K/UL — SIGNIFICANT CHANGE UP (ref 1.8–7.4)
NEUTROPHILS NFR BLD AUTO: 71.1 % — SIGNIFICANT CHANGE UP (ref 43–77)
NITRITE UR-MCNC: POSITIVE
PH UR: 6.5 — SIGNIFICANT CHANGE UP (ref 5–8)
PLATELET # BLD AUTO: 202 K/UL — SIGNIFICANT CHANGE UP (ref 150–400)
POTASSIUM SERPL-MCNC: 4.2 MMOL/L — SIGNIFICANT CHANGE UP (ref 3.5–5.3)
POTASSIUM SERPL-SCNC: 4.2 MMOL/L — SIGNIFICANT CHANGE UP (ref 3.5–5.3)
PROT SERPL-MCNC: 7.3 G/DL — SIGNIFICANT CHANGE UP (ref 6–8.3)
PROT UR-MCNC: SIGNIFICANT CHANGE UP MG/DL
RBC # BLD: 3.84 M/UL — SIGNIFICANT CHANGE UP (ref 3.8–5.2)
RBC # FLD: 14.9 % — HIGH (ref 10.3–14.5)
RBC CASTS # UR COMP ASSIST: 21 /HPF — HIGH (ref 0–4)
RETICS #: 116.2 K/UL — SIGNIFICANT CHANGE UP (ref 25–125)
RETICS/RBC NFR: 3.1 % — HIGH (ref 0.5–2.5)
RH IG SCN BLD-IMP: POSITIVE — SIGNIFICANT CHANGE UP
SODIUM SERPL-SCNC: 139 MMOL/L — SIGNIFICANT CHANGE UP (ref 135–145)
SP GR SPEC: 1.02 — SIGNIFICANT CHANGE UP (ref 1–1.03)
SQUAMOUS # UR AUTO: 1 /HPF — SIGNIFICANT CHANGE UP (ref 0–5)
TIBC SERPL-MCNC: 322 UG/DL — SIGNIFICANT CHANGE UP (ref 220–430)
UIBC SERPL-MCNC: 271 UG/DL — SIGNIFICANT CHANGE UP (ref 110–370)
UROBILINOGEN FLD QL: 0.2 MG/DL — SIGNIFICANT CHANGE UP (ref 0.2–1)
VIT B12 SERPL-MCNC: 915 PG/ML — SIGNIFICANT CHANGE UP (ref 232–1245)
WBC # BLD: 8.91 K/UL — SIGNIFICANT CHANGE UP (ref 3.8–10.5)
WBC # FLD AUTO: 8.91 K/UL — SIGNIFICANT CHANGE UP (ref 3.8–10.5)
WBC UR QL: 97 /HPF — HIGH (ref 0–5)

## 2025-01-06 NOTE — H&P PST ADULT - NSICDXFAMILYHX_GEN_ALL_CORE_FT
Clear
FAMILY HISTORY:  Father  Still living? No  FH: CAD (coronary artery disease), Age at diagnosis: Age Unknown    Mother  Still living? No  Family history of cervical cancer, Age at diagnosis: Age Unknown  FH: breast cancer, Age at diagnosis: Age Unknown

## 2025-01-06 NOTE — H&P PST ADULT - PROBLEM SELECTOR PLAN 3
Pt instructed to take last dose metformin on 01/19/25 - No Metformin day of surgery.  Pt instructed to take last dose Mounjaro on 01/11/25.   FSBS stat DOS

## 2025-01-06 NOTE — H&P PST ADULT - PROBLEM SELECTOR PLAN 1
Now scheduled for right total knee arthroplasty, robotic assisted with Mat tentatively for 01/20/25.  Verbal and written pre-op instructions provided to patient. Patient verbalized understanding and will call surgeons office for revised instructions if surgery is rescheduled. Surgica scrub given with instructions for 3 days.  Labs pending.

## 2025-01-06 NOTE — H&P PST ADULT - HISTORY OF PRESENT ILLNESS
74 year old female with hx presents  preop evaluation for right knee pain. Insidious onset of right knee pain over one years ago which has gotten increasingly worse over time.  Standing for extended periods of time exacerbate the pain. Pain is located in the medial and posteromedial aspects of the knee. The pain radiates to the right hip. Pain is intermittent and better with sitting or laying down. Ambulation is difficult due to the pain and she endorses some occasional episodes of knee instability. She uses a walker for ambulation assistance. Tylenol gives relief. She also uses a heating pad with some relief, as well.  Now scheduled for right knee arthroplasty,  74 year old female with hx presents  preop evaluation for right knee pain. Insidious onset of right knee pain over one years ago which has gotten increasingly worse over time.  Standing for extended periods of time exacerbate the pain. Pain is located in the medial and posteromedial aspects of the knee. The pain radiates to the right hip. Pain is intermittent and better with sitting or laying down. Ambulation is difficult due to the pain and she endorses some occasional episodes of knee instability. She uses a walker for ambulation assistance. Tylenol gives relief. She also uses a heating pad with some relief, as well.  Now scheduled for right total knee arthroplasty, robotic assisted with Mat tentatively for 01/20/25.

## 2025-01-06 NOTE — H&P PST ADULT - LAST ECHOCARDIOGRAM
5/3/24 - 5/3/24 - Left ventricular systolic function is normal with an ejection fraction of 66 % by visual interpretation visually estimated at 65 to 70%. There is normal left ventricular diastolic function.

## 2025-01-06 NOTE — H&P PST ADULT - PROBLEM SELECTOR PLAN 2
Patient instructed to take Metoprolol with a sip of water on the morning of procedure and Telmisartan /Hctz as per routine - PM prior to surgery

## 2025-01-06 NOTE — H&P PST ADULT - MUSCULOSKELETAL COMMENTS
Hx Lumbar spine surgery with fusion x 2 , fx spine follow up with surgeon every 6 months right knee OA

## 2025-01-06 NOTE — H&P PST ADULT - NEGATIVE GENERAL GENITOURINARY SYMPTOMS
hx Frequent UTIs -  on Hyprex/no hematuria/no renal colic/no flank pain L/no flank pain R hx Frequent UTIs -  on Hiprex/no hematuria/no renal colic/no flank pain L/no flank pain R

## 2025-01-07 LAB
MRSA PCR RESULT.: SIGNIFICANT CHANGE UP
S AUREUS DNA NOSE QL NAA+PROBE: DETECTED

## 2025-01-08 LAB
-  AMPICILLIN/SULBACTAM: SIGNIFICANT CHANGE UP
-  AMPICILLIN: SIGNIFICANT CHANGE UP
-  AZTREONAM: SIGNIFICANT CHANGE UP
-  CEFAZOLIN: SIGNIFICANT CHANGE UP
-  CEFEPIME: SIGNIFICANT CHANGE UP
-  CEFTRIAXONE: SIGNIFICANT CHANGE UP
-  CEFUROXIME: SIGNIFICANT CHANGE UP
-  CIPROFLOXACIN: SIGNIFICANT CHANGE UP
-  ERTAPENEM: SIGNIFICANT CHANGE UP
-  GENTAMICIN: SIGNIFICANT CHANGE UP
-  LEVOFLOXACIN: SIGNIFICANT CHANGE UP
-  MEROPENEM: SIGNIFICANT CHANGE UP
-  NITROFURANTOIN: SIGNIFICANT CHANGE UP
-  PIPERACILLIN/TAZOBACTAM: SIGNIFICANT CHANGE UP
-  TOBRAMYCIN: SIGNIFICANT CHANGE UP
-  TRIMETHOPRIM/SULFAMETHOXAZOLE: SIGNIFICANT CHANGE UP
CULTURE RESULTS: ABNORMAL
METHOD TYPE: SIGNIFICANT CHANGE UP
ORGANISM # SPEC MICROSCOPIC CNT: ABNORMAL
ORGANISM # SPEC MICROSCOPIC CNT: ABNORMAL
SPECIMEN SOURCE: SIGNIFICANT CHANGE UP

## 2025-01-10 ENCOUNTER — APPOINTMENT (OUTPATIENT)
Dept: ORTHOPEDIC SURGERY | Facility: CLINIC | Age: 75
End: 2025-01-10

## 2025-01-14 DIAGNOSIS — Z96.651 PRESENCE OF RIGHT ARTIFICIAL KNEE JOINT: ICD-10-CM

## 2025-01-14 RX ORDER — MUPIROCIN 20 MG/G
2 OINTMENT TOPICAL TWICE DAILY
Qty: 1 | Refills: 0 | Status: ACTIVE | COMMUNITY
Start: 2025-01-14 | End: 1900-01-01

## 2025-01-17 NOTE — ASU PATIENT PROFILE, ADULT - FALL HARM RISK - HARM RISK INTERVENTIONS

## 2025-01-20 ENCOUNTER — TRANSCRIPTION ENCOUNTER (OUTPATIENT)
Age: 75
End: 2025-01-20

## 2025-01-20 ENCOUNTER — APPOINTMENT (OUTPATIENT)
Dept: ORTHOPEDIC SURGERY | Facility: HOSPITAL | Age: 75
End: 2025-01-20

## 2025-01-20 ENCOUNTER — INPATIENT (INPATIENT)
Facility: HOSPITAL | Age: 75
LOS: 0 days | Discharge: HOME HEALTH | End: 2025-01-21
Attending: ORTHOPAEDIC SURGERY | Admitting: ORTHOPAEDIC SURGERY
Payer: MEDICARE

## 2025-01-20 VITALS
RESPIRATION RATE: 15 BRPM | OXYGEN SATURATION: 98 % | DIASTOLIC BLOOD PRESSURE: 61 MMHG | SYSTOLIC BLOOD PRESSURE: 146 MMHG | WEIGHT: 218.04 LBS | TEMPERATURE: 98 F | HEIGHT: 63 IN | HEART RATE: 70 BPM

## 2025-01-20 DIAGNOSIS — H26.9 UNSPECIFIED CATARACT: Chronic | ICD-10-CM

## 2025-01-20 DIAGNOSIS — M17.11 UNILATERAL PRIMARY OSTEOARTHRITIS, RIGHT KNEE: ICD-10-CM

## 2025-01-20 DIAGNOSIS — Z90.3 ACQUIRED ABSENCE OF STOMACH [PART OF]: Chronic | ICD-10-CM

## 2025-01-20 DIAGNOSIS — Z98.1 ARTHRODESIS STATUS: Chronic | ICD-10-CM

## 2025-01-20 LAB
GLUCOSE BLDC GLUCOMTR-MCNC: 134 MG/DL — HIGH (ref 70–99)
GLUCOSE BLDC GLUCOMTR-MCNC: 153 MG/DL — HIGH (ref 70–99)
GLUCOSE BLDC GLUCOMTR-MCNC: 161 MG/DL — HIGH (ref 70–99)
GLUCOSE BLDC GLUCOMTR-MCNC: 168 MG/DL — HIGH (ref 70–99)
GLUCOSE BLDC GLUCOMTR-MCNC: 234 MG/DL — HIGH (ref 70–99)

## 2025-01-20 PROCEDURE — 20985 CPTR-ASST DIR MS PX: CPT

## 2025-01-20 PROCEDURE — 27447 TOTAL KNEE ARTHROPLASTY: CPT | Mod: RT

## 2025-01-20 PROCEDURE — 73560 X-RAY EXAM OF KNEE 1 OR 2: CPT | Mod: 26,RT

## 2025-01-20 PROCEDURE — S2900 ROBOTIC SURGICAL SYSTEM: CPT | Mod: NC

## 2025-01-20 PROCEDURE — 0055T BONE SRGRY CMPTR CT/MRI IMAG: CPT

## 2025-01-20 DEVICE — BASEPLATE TIB TRIATHLON TRITAN SZ 4: Type: IMPLANTABLE DEVICE | Site: RIGHT | Status: FUNCTIONAL

## 2025-01-20 DEVICE — MAKO BONE PIN 4MM X 110MM: Type: IMPLANTABLE DEVICE | Site: RIGHT | Status: FUNCTIONAL

## 2025-01-20 DEVICE — INSERT TIB BEARING CS X3 SZ 4 9MM: Type: IMPLANTABLE DEVICE | Site: RIGHT | Status: FUNCTIONAL

## 2025-01-20 DEVICE — COMP FEM CR CMNTLSS BEADED W/ PA SZ 4 RT: Type: IMPLANTABLE DEVICE | Site: RIGHT | Status: FUNCTIONAL

## 2025-01-20 RX ORDER — INSULIN LISPRO 100/ML
VIAL (ML) SUBCUTANEOUS AT BEDTIME
Refills: 0 | Status: DISCONTINUED | OUTPATIENT
Start: 2025-01-20 | End: 2025-01-21

## 2025-01-20 RX ORDER — PANTOPRAZOLE 20 MG/1
40 TABLET, DELAYED RELEASE ORAL
Refills: 0 | Status: DISCONTINUED | OUTPATIENT
Start: 2025-01-20 | End: 2025-01-21

## 2025-01-20 RX ORDER — TELMISARTAN AND HYDROCHLOROTHIAZIDE 80; 25 MG/1; MG/1
1 TABLET ORAL
Refills: 0 | DISCHARGE

## 2025-01-20 RX ORDER — ATORVASTATIN CALCIUM 80 MG/1
20 TABLET, FILM COATED ORAL AT BEDTIME
Refills: 0 | Status: DISCONTINUED | OUTPATIENT
Start: 2025-01-20 | End: 2025-01-21

## 2025-01-20 RX ORDER — DM/PSEUDOEPHED/ACETAMINOPHEN 10-30-250
12.5 CAPSULE ORAL ONCE
Refills: 0 | Status: DISCONTINUED | OUTPATIENT
Start: 2025-01-20 | End: 2025-01-21

## 2025-01-20 RX ORDER — CYANOCOBALAMIN (VITAMIN B-12) 1000MCG/ML
1000 VIAL (ML) INJECTION DAILY
Refills: 0 | Status: DISCONTINUED | OUTPATIENT
Start: 2025-01-20 | End: 2025-01-21

## 2025-01-20 RX ORDER — BACTERIOSTATIC SODIUM CHLORIDE 0.9 %
500 VIAL (ML) INJECTION ONCE
Refills: 0 | Status: COMPLETED | OUTPATIENT
Start: 2025-01-20 | End: 2025-01-20

## 2025-01-20 RX ORDER — GLUCAGON 3 MG/1
1 POWDER NASAL ONCE
Refills: 0 | Status: DISCONTINUED | OUTPATIENT
Start: 2025-01-20 | End: 2025-01-21

## 2025-01-20 RX ORDER — BACTERIOSTATIC SODIUM CHLORIDE 0.9 %
500 VIAL (ML) INJECTION ONCE
Refills: 0 | Status: COMPLETED | OUTPATIENT
Start: 2025-01-20 | End: 2025-01-21

## 2025-01-20 RX ORDER — CEFAZOLIN SODIUM IN 0.9 % NACL 2 G/10 ML
2000 SYRINGE (ML) INTRAVENOUS EVERY 8 HOURS
Refills: 0 | Status: COMPLETED | OUTPATIENT
Start: 2025-01-20 | End: 2025-01-20

## 2025-01-20 RX ORDER — TRAMADOL HYDROCHLORIDE 100 MG/1
50 TABLET, EXTENDED RELEASE ORAL EVERY 6 HOURS
Refills: 0 | Status: DISCONTINUED | OUTPATIENT
Start: 2025-01-20 | End: 2025-01-21

## 2025-01-20 RX ORDER — ACETAMINOPHEN 160 MG/5ML
1000 SUSPENSION ORAL ONCE
Refills: 0 | Status: COMPLETED | OUTPATIENT
Start: 2025-01-21 | End: 2025-01-21

## 2025-01-20 RX ORDER — DM/PSEUDOEPHED/ACETAMINOPHEN 10-30-250
25 CAPSULE ORAL ONCE
Refills: 0 | Status: DISCONTINUED | OUTPATIENT
Start: 2025-01-20 | End: 2025-01-21

## 2025-01-20 RX ORDER — METOPROLOL SUCCINATE 25 MG
25 TABLET, EXTENDED RELEASE 24 HR ORAL DAILY
Refills: 0 | Status: DISCONTINUED | OUTPATIENT
Start: 2025-01-20 | End: 2025-01-21

## 2025-01-20 RX ORDER — ANTISEPTIC SURGICAL SCRUB 0.04 MG/ML
1 SOLUTION TOPICAL DAILY
Refills: 0 | Status: DISCONTINUED | OUTPATIENT
Start: 2025-01-20 | End: 2025-01-21

## 2025-01-20 RX ORDER — POLYETHYLENE GLYCOL 3350 17 G/17G
17 POWDER, FOR SOLUTION ORAL AT BEDTIME
Refills: 0 | Status: DISCONTINUED | OUTPATIENT
Start: 2025-01-20 | End: 2025-01-21

## 2025-01-20 RX ORDER — SODIUM CHLORIDE 9 G/ML
1000 INJECTION, SOLUTION INTRAVENOUS
Refills: 0 | Status: DISCONTINUED | OUTPATIENT
Start: 2025-01-20 | End: 2025-01-21

## 2025-01-20 RX ORDER — PREGABALIN CAPSULES, CV 225 MG/1
150 CAPSULE ORAL THREE TIMES A DAY
Refills: 0 | Status: DISCONTINUED | OUTPATIENT
Start: 2025-01-20 | End: 2025-01-21

## 2025-01-20 RX ORDER — LOSARTAN POTASSIUM 100 MG
100 TABLET ORAL DAILY
Refills: 0 | Status: DISCONTINUED | OUTPATIENT
Start: 2025-01-20 | End: 2025-01-21

## 2025-01-20 RX ORDER — INSULIN LISPRO 100/ML
VIAL (ML) SUBCUTANEOUS
Refills: 0 | Status: DISCONTINUED | OUTPATIENT
Start: 2025-01-20 | End: 2025-01-21

## 2025-01-20 RX ORDER — ASPIRIN 81 MG/1
81 TABLET, COATED ORAL
Refills: 0 | Status: DISCONTINUED | OUTPATIENT
Start: 2025-01-20 | End: 2025-01-21

## 2025-01-20 RX ORDER — ONDANSETRON 4 MG/1
4 TABLET, ORALLY DISINTEGRATING ORAL EVERY 6 HOURS
Refills: 0 | Status: DISCONTINUED | OUTPATIENT
Start: 2025-01-20 | End: 2025-01-21

## 2025-01-20 RX ORDER — DM/PSEUDOEPHED/ACETAMINOPHEN 10-30-250
15 CAPSULE ORAL ONCE
Refills: 0 | Status: DISCONTINUED | OUTPATIENT
Start: 2025-01-20 | End: 2025-01-21

## 2025-01-20 RX ORDER — METFORMIN 850 MG/1
1 TABLET ORAL
Refills: 0 | DISCHARGE

## 2025-01-20 RX ORDER — PREGABALIN 100 MG/1
1 CAPSULE ORAL
Refills: 0 | DISCHARGE

## 2025-01-20 RX ORDER — ACETAMINOPHEN 160 MG/5ML
1000 SUSPENSION ORAL ONCE
Refills: 0 | Status: COMPLETED | OUTPATIENT
Start: 2025-01-20 | End: 2025-01-20

## 2025-01-20 RX ORDER — B COMPLEX, C NO.20/FOLIC ACID 1 MG
1 CAPSULE ORAL
Refills: 0 | DISCHARGE

## 2025-01-20 RX ORDER — MAGNESIUM HYDROXIDE 400 MG/5ML
30 SUSPENSION, ORAL (FINAL DOSE FORM) ORAL DAILY
Refills: 0 | Status: DISCONTINUED | OUTPATIENT
Start: 2025-01-20 | End: 2025-01-21

## 2025-01-20 RX ORDER — ATORVASTATIN CALCIUM 40 MG/1
1 TABLET, FILM COATED ORAL
Refills: 0 | DISCHARGE

## 2025-01-20 RX ORDER — HYDROMORPHONE HYDROCHLORIDE 4 MG/ML
0.5 INJECTION, SOLUTION INTRAMUSCULAR; INTRAVENOUS; SUBCUTANEOUS
Refills: 0 | Status: DISCONTINUED | OUTPATIENT
Start: 2025-01-20 | End: 2025-01-20

## 2025-01-20 RX ORDER — METHENAMINE MANDELATE 1 G
1 TABLET ORAL
Refills: 0 | Status: DISCONTINUED | OUTPATIENT
Start: 2025-01-20 | End: 2025-01-21

## 2025-01-20 RX ORDER — HYDROMORPHONE HYDROCHLORIDE 4 MG/ML
0.25 INJECTION, SOLUTION INTRAMUSCULAR; INTRAVENOUS; SUBCUTANEOUS
Refills: 0 | Status: DISCONTINUED | OUTPATIENT
Start: 2025-01-20 | End: 2025-01-20

## 2025-01-20 RX ORDER — METOPROLOL TARTRATE 50 MG
1 TABLET ORAL
Refills: 0 | DISCHARGE

## 2025-01-20 RX ORDER — SENNOSIDES 8.6 MG
2 TABLET ORAL AT BEDTIME
Refills: 0 | Status: DISCONTINUED | OUTPATIENT
Start: 2025-01-20 | End: 2025-01-21

## 2025-01-20 RX ORDER — OXYCODONE HYDROCHLORIDE 30 MG/1
5 TABLET ORAL EVERY 4 HOURS
Refills: 0 | Status: DISCONTINUED | OUTPATIENT
Start: 2025-01-20 | End: 2025-01-21

## 2025-01-20 RX ORDER — TIRZEPATIDE 7.5 MG/.5ML
15 INJECTION, SOLUTION SUBCUTANEOUS
Refills: 0 | DISCHARGE

## 2025-01-20 RX ORDER — TRAMADOL HYDROCHLORIDE 100 MG/1
50 TABLET, EXTENDED RELEASE ORAL ONCE
Refills: 0 | Status: DISCONTINUED | OUTPATIENT
Start: 2025-01-20 | End: 2025-01-20

## 2025-01-20 RX ORDER — ACETAMINOPHEN 160 MG/5ML
975 SUSPENSION ORAL EVERY 8 HOURS
Refills: 0 | Status: DISCONTINUED | OUTPATIENT
Start: 2025-01-21 | End: 2025-01-21

## 2025-01-20 RX ORDER — CELECOXIB 200 MG
200 CAPSULE ORAL EVERY 12 HOURS
Refills: 0 | Status: DISCONTINUED | OUTPATIENT
Start: 2025-01-22 | End: 2025-01-21

## 2025-01-20 RX ORDER — KETOROLAC TROMETHAMINE 10 MG
15 TABLET ORAL EVERY 6 HOURS
Refills: 0 | Status: DISCONTINUED | OUTPATIENT
Start: 2025-01-20 | End: 2025-01-21

## 2025-01-20 RX ORDER — PANTOPRAZOLE 40 MG/1
1 TABLET, DELAYED RELEASE ORAL
Refills: 0 | DISCHARGE

## 2025-01-20 RX ORDER — OXYCODONE HYDROCHLORIDE 30 MG/1
10 TABLET ORAL EVERY 4 HOURS
Refills: 0 | Status: DISCONTINUED | OUTPATIENT
Start: 2025-01-20 | End: 2025-01-21

## 2025-01-20 RX ORDER — ERTAPENEM SODIUM 1 G/1
1000 INJECTION, POWDER, LYOPHILIZED, FOR SOLUTION INTRAMUSCULAR; INTRAVENOUS ONCE
Refills: 0 | Status: COMPLETED | OUTPATIENT
Start: 2025-01-20 | End: 2025-01-20

## 2025-01-20 RX ADMIN — Medication 15 MILLIGRAM(S): at 17:23

## 2025-01-20 RX ADMIN — Medication 2: at 17:05

## 2025-01-20 RX ADMIN — ACETAMINOPHEN 400 MILLIGRAM(S): 160 SUSPENSION ORAL at 17:09

## 2025-01-20 RX ADMIN — POLYETHYLENE GLYCOL 3350 17 GRAM(S): 17 POWDER, FOR SOLUTION ORAL at 23:20

## 2025-01-20 RX ADMIN — Medication 2 TABLET(S): at 23:19

## 2025-01-20 RX ADMIN — ATORVASTATIN CALCIUM 20 MILLIGRAM(S): 80 TABLET, FILM COATED ORAL at 23:19

## 2025-01-20 RX ADMIN — Medication 100 MILLIGRAM(S): at 23:16

## 2025-01-20 RX ADMIN — PREGABALIN CAPSULES, CV 150 MILLIGRAM(S): 225 CAPSULE ORAL at 23:20

## 2025-01-20 RX ADMIN — Medication 15 MILLIGRAM(S): at 23:20

## 2025-01-20 RX ADMIN — Medication 1000 MICROGRAM(S): at 12:08

## 2025-01-20 RX ADMIN — Medication 1 GRAM(S): at 18:21

## 2025-01-20 RX ADMIN — TRAMADOL HYDROCHLORIDE 50 MILLIGRAM(S): 100 TABLET, EXTENDED RELEASE ORAL at 06:53

## 2025-01-20 RX ADMIN — ASPIRIN 81 MILLIGRAM(S): 81 TABLET, COATED ORAL at 17:09

## 2025-01-20 RX ADMIN — ERTAPENEM SODIUM 120 MILLIGRAM(S): 1 INJECTION, POWDER, LYOPHILIZED, FOR SOLUTION INTRAMUSCULAR; INTRAVENOUS at 07:03

## 2025-01-20 RX ADMIN — ANTISEPTIC SURGICAL SCRUB 1 APPLICATION(S): 0.04 SOLUTION TOPICAL at 06:53

## 2025-01-20 RX ADMIN — Medication 500 MILLILITER(S): at 14:58

## 2025-01-20 RX ADMIN — Medication 500 MILLILITER(S): at 10:00

## 2025-01-20 RX ADMIN — ACETAMINOPHEN 1000 MILLIGRAM(S): 160 SUSPENSION ORAL at 17:23

## 2025-01-20 RX ADMIN — PREGABALIN CAPSULES, CV 150 MILLIGRAM(S): 225 CAPSULE ORAL at 13:43

## 2025-01-20 RX ADMIN — Medication 100 MILLIGRAM(S): at 15:54

## 2025-01-20 RX ADMIN — Medication 15 MILLIGRAM(S): at 15:53

## 2025-01-20 NOTE — OCCUPATIONAL THERAPY INITIAL EVALUATION ADULT - PERTINENT HX OF CURRENT PROBLEM, REHAB EVAL
Pt is a 74 year old female with hx of Osteoarthritis of right knee. Pt is now s/p Right total knee arthroplasty on 1/20/25.

## 2025-01-20 NOTE — BRIEF OPERATIVE NOTE - NSICDXBRIEFPREOP_GEN_ALL_CORE_FT
PRE-OP DIAGNOSIS:  Osteoarthritis of right knee 20-Jan-2025 09:48:36  Misty Rosales  
PRE-OP DIAGNOSIS:  Osteoarthritis of right knee 20-Jan-2025 09:48:36  Misty Rosales

## 2025-01-20 NOTE — OCCUPATIONAL THERAPY INITIAL EVALUATION ADULT - GENERAL OBSERVATIONS, REHAB EVAL
Pt. received sitting in chair next to bed. No acute distress. Patient agreed to evaluation from Occupational Therapist. +IV.  bedside. O2 100%.

## 2025-01-20 NOTE — ASU PREOP CHECKLIST - INTERNAL PROSTHESES
Pt denies taking blood thinners, per pt son pt only on baby aspirin. Pt med list in phone does not list anticoagulant   no

## 2025-01-20 NOTE — BRIEF OPERATIVE NOTE - NSICDXBRIEFPOSTOP_GEN_ALL_CORE_FT
POST-OP DIAGNOSIS:  Osteoarthritis of right knee 20-Jan-2025 09:48:50  Panchito Banerjee  Osteoarthritis of right knee 20-Jan-2025 09:48:56  Misty Rosales  
POST-OP DIAGNOSIS:  Osteoarthritis of right knee 20-Jan-2025 09:48:56  Misty Rosales

## 2025-01-20 NOTE — BRIEF OPERATIVE NOTE - NSICDXBRIEFPROCEDURE_GEN_ALL_CORE_FT
PROCEDURES:  Right total knee arthroplasty 20-Jan-2025 09:48:20  Misty Rosales  
PROCEDURES:  Right total knee arthroplasty 20-Jan-2025 09:48:20  Misty Rosales

## 2025-01-20 NOTE — PATIENT PROFILE ADULT - FALL HARM RISK - RISK INTERVENTIONS
Assistance OOB with selected safe patient handling equipment/Assistance with ambulation/Communicate Fall Risk and Risk Factors to all staff, patient, and family/Discuss with provider need for PT consult/Monitor gait and stability/Provide patient with walking aids - walker, cane, crutches/Reinforce activity limits and safety measures with patient and family/Sit up slowly, dangle for a short time, stand at bedside before walking/Use of alarms - bed, chair and/or voice tab/Visual Cue: Yellow wristband/Bed in lowest position, wheels locked, appropriate side rails in place/Call bell, personal items and telephone in reach/Instruct patient to call for assistance before getting out of bed or chair/Non-slip footwear when patient is out of bed/[place] to call system/Physically safe environment - no spills, clutter or unnecessary equipment/Purposeful Proactive Rounding/Room/bathroom lighting operational, light cord in reach

## 2025-01-20 NOTE — OCCUPATIONAL THERAPY INITIAL EVALUATION ADULT - MD ORDER
Occupational Therapy (OT) to evaluate and treat. Out of Bed to Chair. Per MANOJ ACOSTA, pt is okay to participate in OT evaluation and perform activity as tolerated.

## 2025-01-20 NOTE — OCCUPATIONAL THERAPY INITIAL EVALUATION ADULT - NSBATHINGEQUIP_GEN_A_OT
Pt. educated on benefits and how to privately purchase if needed. Pt reports she has a grab bar already./shower chair

## 2025-01-20 NOTE — PHYSICAL THERAPY INITIAL EVALUATION ADULT - ACTIVE RANGE OF MOTION EXAMINATION, REHAB EVAL
right knee ROM 5-80 degrees, hip and ankle WFL/bilateral upper extremity Active ROM was WFL (within functional limits)/Left LE Active ROM was WFL (within functional limits)

## 2025-01-20 NOTE — OCCUPATIONAL THERAPY INITIAL EVALUATION ADULT - NSDRESSINGEQUIP_GEN_A_OT
Pt. educated on benefits and how to privately purchase if needed. Pt. reports she prefers  to assist her as needed./hip kit

## 2025-01-20 NOTE — CHART NOTE - NSCHARTNOTEFT_GEN_A_CORE
Post Operative Note  Patient: DAVID PEACE 74y (1950) Female   MRN: [id number]  Location: [address]  Visit: 01-20-25 Inpatient  Date: 01-20-25 @ 16:23    Procedure: S/P R TKA    Subjective:       Objective:  Vitals: T(F): 97.7 (01-20-25 @ 14:28), Max: 98.2 (01-20-25 @ 05:58)  HR: 72 (01-20-25 @ 14:28)  BP: 148/59 (01-20-25 @ 14:28) (121/43 - 148/59)  RR: 16 (01-20-25 @ 14:28)  SpO2: 100% (01-20-25 @ 14:28)  Vent Settings:     In:   01-20-25 @ 07:01  -  01-20-25 @ 16:23  --------------------------------------------------------  IN: 850 mL      IV Fluids: cyanocobalamin 1000 MICROGram(s) Oral daily  dextrose 5%. 1000 milliLiter(s) (50 mL/Hr) IV Continuous <Continuous>  dextrose 5%. 1000 milliLiter(s) (100 mL/Hr) IV Continuous <Continuous>  sodium chloride 0.9% Bolus 500 milliLiter(s) IV Bolus once      Out:   01-20-25 @ 07:01 - 01-20-25 @ 16:23  --------------------------------------------------------  OUT: 200 mL      EBL:     Voided Urine:   01-20-25 @ 07:01 - 01-20-25 @ 16:23  --------------------------------------------------------  OUT: 200 mL      Shabazz Catheter: yes no   Drains:   JOE:    ,   Chest Tube:      NG Tube:         Physical Examination:  General: NAD, resting comfortably in bed  Respiratory: Nonlabored respirations, normal CW expansion.  Right Lower Extremity:  dressings c/d/i  SILT S/S/DP/SP/T  +EHL/FHL/TA/GSC  Compartments soft/non-tender  Toes warm, Cap refill brisk/warm and perfused, +DP/PT pulse         Imaging:  No post-op imaging studies    Assessment:  74yFemale patient S/P R TKA    Plan:  - IV Abx: Ancef  - Pain control PRN  - Labs: AM labs  - Diet: regular  - Activity: WBAT RLE  - DVT ppx: A81 BID    Date/Time: 01-20-25 @ 16:23

## 2025-01-20 NOTE — PHYSICAL THERAPY INITIAL EVALUATION ADULT - NSPTDISCHREC_GEN_A_CORE
to optimize safety in the home environment and promote improvement of strength and functional deficits. Patient has safely demonstrated the ability to perform ambulation, stairs and car transfers therefore is functionally cleared from Physical Therapy perspective.  Will continue to follow while here in the hospital until discharge./Home PT

## 2025-01-20 NOTE — PHYSICAL THERAPY INITIAL EVALUATION ADULT - ADDITIONAL COMMENTS
Pt states she lives with her  in a house with 4 steps to enter +bilateral rail and a flight inside +right handrail up. Prior to admission pt reports being independent in ADLs and ambulation with a four wheeled rolling walker. Bathroom has grab bars.    Following evaluation, pt was left sitting in chair in no distress, call bell in reach.

## 2025-01-20 NOTE — OCCUPATIONAL THERAPY INITIAL EVALUATION ADULT - LIVES WITH, PROFILE
Pt. reports she lives with her  in a house with 4 steps to enter. Once inside, pt. reports she has full flight of steps to negotiate to 2nd floor where main bedroom and bathroom are located. Per pt., she has a bathtub in her bathroom, +grab bar.

## 2025-01-21 ENCOUNTER — TRANSCRIPTION ENCOUNTER (OUTPATIENT)
Age: 75
End: 2025-01-21

## 2025-01-21 VITALS
DIASTOLIC BLOOD PRESSURE: 50 MMHG | SYSTOLIC BLOOD PRESSURE: 125 MMHG | TEMPERATURE: 98 F | OXYGEN SATURATION: 100 % | RESPIRATION RATE: 16 BRPM | HEART RATE: 59 BPM

## 2025-01-21 LAB
ANION GAP SERPL CALC-SCNC: 14 MMOL/L — SIGNIFICANT CHANGE UP (ref 7–14)
BUN SERPL-MCNC: 32 MG/DL — HIGH (ref 7–23)
CALCIUM SERPL-MCNC: 8.5 MG/DL — SIGNIFICANT CHANGE UP (ref 8.4–10.5)
CHLORIDE SERPL-SCNC: 102 MMOL/L — SIGNIFICANT CHANGE UP (ref 98–107)
CO2 SERPL-SCNC: 22 MMOL/L — SIGNIFICANT CHANGE UP (ref 22–31)
CREAT SERPL-MCNC: 0.92 MG/DL — SIGNIFICANT CHANGE UP (ref 0.5–1.3)
EGFR: 66 ML/MIN/1.73M2 — SIGNIFICANT CHANGE UP
GLUCOSE BLDC GLUCOMTR-MCNC: 128 MG/DL — HIGH (ref 70–99)
GLUCOSE BLDC GLUCOMTR-MCNC: 133 MG/DL — HIGH (ref 70–99)
GLUCOSE BLDC GLUCOMTR-MCNC: 178 MG/DL — HIGH (ref 70–99)
GLUCOSE SERPL-MCNC: 144 MG/DL — HIGH (ref 70–99)
HCT VFR BLD CALC: 29.5 % — LOW (ref 34.5–45)
HGB BLD-MCNC: 9.4 G/DL — LOW (ref 11.5–15.5)
MCHC RBC-ENTMCNC: 28.9 PG — SIGNIFICANT CHANGE UP (ref 27–34)
MCHC RBC-ENTMCNC: 31.9 G/DL — LOW (ref 32–36)
MCV RBC AUTO: 90.8 FL — SIGNIFICANT CHANGE UP (ref 80–100)
NRBC # BLD AUTO: 0 K/UL — SIGNIFICANT CHANGE UP (ref 0–0)
NRBC # BLD: 0 /100 WBCS — SIGNIFICANT CHANGE UP (ref 0–0)
NRBC # FLD: 0 K/UL — SIGNIFICANT CHANGE UP (ref 0–0)
NRBC BLD-RTO: 0 /100 WBCS — SIGNIFICANT CHANGE UP (ref 0–0)
PLATELET # BLD AUTO: 198 K/UL — SIGNIFICANT CHANGE UP (ref 150–400)
POTASSIUM SERPL-MCNC: 4.3 MMOL/L — SIGNIFICANT CHANGE UP (ref 3.5–5.3)
POTASSIUM SERPL-SCNC: 4.3 MMOL/L — SIGNIFICANT CHANGE UP (ref 3.5–5.3)
RBC # BLD: 3.25 M/UL — LOW (ref 3.8–5.2)
RBC # FLD: 14.4 % — SIGNIFICANT CHANGE UP (ref 10.3–14.5)
SODIUM SERPL-SCNC: 138 MMOL/L — SIGNIFICANT CHANGE UP (ref 135–145)
WBC # BLD: 12.23 K/UL — HIGH (ref 3.8–10.5)
WBC # FLD AUTO: 12.23 K/UL — HIGH (ref 3.8–10.5)

## 2025-01-21 RX ORDER — POLYETHYLENE GLYCOL 3350 17 G/17G
17 POWDER, FOR SOLUTION ORAL
Qty: 238 | Refills: 0
Start: 2025-01-21 | End: 2025-02-03

## 2025-01-21 RX ORDER — METHENAMINE MANDELATE 1 G
1 TABLET ORAL
Qty: 0 | Refills: 0 | DISCHARGE
Start: 2025-01-21

## 2025-01-21 RX ORDER — CALCIUM CARBONATE 750 MG/1
0 TABLET, CHEWABLE ORAL
Refills: 0 | DISCHARGE

## 2025-01-21 RX ORDER — GUSELKUMAB 100 MG/ML
0 INJECTION SUBCUTANEOUS
Refills: 0 | DISCHARGE

## 2025-01-21 RX ORDER — OXYCODONE HYDROCHLORIDE 30 MG/1
1 TABLET ORAL
Qty: 42 | Refills: 0
Start: 2025-01-21 | End: 2025-01-27

## 2025-01-21 RX ORDER — CRANBERRY CONC/ASCORBIC ACID 4200-20 MG
0 CAPSULE ORAL
Refills: 0 | DISCHARGE

## 2025-01-21 RX ORDER — ACETAMINOPHEN 160 MG/5ML
3 SUSPENSION ORAL
Qty: 0 | Refills: 0 | DISCHARGE
Start: 2025-01-21

## 2025-01-21 RX ORDER — TRAMADOL HYDROCHLORIDE 100 MG/1
1 TABLET, EXTENDED RELEASE ORAL
Qty: 28 | Refills: 0
Start: 2025-01-21 | End: 2025-01-27

## 2025-01-21 RX ORDER — ASPIRIN 81 MG/1
1 TABLET, COATED ORAL
Qty: 84 | Refills: 0
Start: 2025-01-21 | End: 2025-03-03

## 2025-01-21 RX ORDER — ASCORBIC ACID 1000 MG
0 TABLET ORAL
Refills: 0 | DISCHARGE

## 2025-01-21 RX ORDER — CELECOXIB 200 MG
1 CAPSULE ORAL
Qty: 28 | Refills: 0
Start: 2025-01-21 | End: 2025-02-03

## 2025-01-21 RX ORDER — NALOXONE HYDROCHLORIDE 3 MG/.1ML
1 SPRAY NASAL
Qty: 1 | Refills: 0
Start: 2025-01-21 | End: 2025-01-21

## 2025-01-21 RX ORDER — CHOLECALCIFEROL (VITAMIN D3) 10 MCG
0 TABLET ORAL
Refills: 0 | DISCHARGE

## 2025-01-21 RX ORDER — SENNOSIDES 8.6 MG
2 TABLET ORAL
Qty: 28 | Refills: 0
Start: 2025-01-21 | End: 2025-02-03

## 2025-01-21 RX ADMIN — Medication 1: at 17:07

## 2025-01-21 RX ADMIN — ACETAMINOPHEN 400 MILLIGRAM(S): 160 SUSPENSION ORAL at 08:14

## 2025-01-21 RX ADMIN — PREGABALIN CAPSULES, CV 150 MILLIGRAM(S): 225 CAPSULE ORAL at 05:55

## 2025-01-21 RX ADMIN — Medication 15 MILLIGRAM(S): at 05:52

## 2025-01-21 RX ADMIN — OXYCODONE HYDROCHLORIDE 5 MILLIGRAM(S): 30 TABLET ORAL at 11:45

## 2025-01-21 RX ADMIN — Medication 1 GRAM(S): at 05:56

## 2025-01-21 RX ADMIN — PANTOPRAZOLE 40 MILLIGRAM(S): 20 TABLET, DELAYED RELEASE ORAL at 05:57

## 2025-01-21 RX ADMIN — Medication 100 MILLIGRAM(S): at 05:55

## 2025-01-21 RX ADMIN — ACETAMINOPHEN 1000 MILLIGRAM(S): 160 SUSPENSION ORAL at 01:35

## 2025-01-21 RX ADMIN — OXYCODONE HYDROCHLORIDE 10 MILLIGRAM(S): 30 TABLET ORAL at 16:45

## 2025-01-21 RX ADMIN — PREGABALIN CAPSULES, CV 150 MILLIGRAM(S): 225 CAPSULE ORAL at 13:46

## 2025-01-21 RX ADMIN — Medication 15 MILLIGRAM(S): at 06:52

## 2025-01-21 RX ADMIN — Medication 1000 MICROGRAM(S): at 13:46

## 2025-01-21 RX ADMIN — OXYCODONE HYDROCHLORIDE 5 MILLIGRAM(S): 30 TABLET ORAL at 10:47

## 2025-01-21 RX ADMIN — ASPIRIN 81 MILLIGRAM(S): 81 TABLET, COATED ORAL at 05:54

## 2025-01-21 RX ADMIN — Medication 500 MILLILITER(S): at 06:00

## 2025-01-21 RX ADMIN — Medication 15 MILLIGRAM(S): at 11:45

## 2025-01-21 RX ADMIN — Medication 25 MILLIGRAM(S): at 05:55

## 2025-01-21 RX ADMIN — ACETAMINOPHEN 1000 MILLIGRAM(S): 160 SUSPENSION ORAL at 08:57

## 2025-01-21 RX ADMIN — ACETAMINOPHEN 400 MILLIGRAM(S): 160 SUSPENSION ORAL at 00:35

## 2025-01-21 RX ADMIN — OXYCODONE HYDROCHLORIDE 10 MILLIGRAM(S): 30 TABLET ORAL at 15:45

## 2025-01-21 RX ADMIN — Medication 15 MILLIGRAM(S): at 10:47

## 2025-01-21 RX ADMIN — Medication 15 MILLIGRAM(S): at 00:20

## 2025-01-21 NOTE — PROGRESS NOTE ADULT - ASSESSMENT
A/P: 74y y/o Female s/p R cholo total knee arthroplasty, DOS 1/20/2025  - Pain control  - Antibiotic - Ancef postop  - Incentive Spirometry  - DVT prophylaxis: Aspirin 81mg BID  - F/U AM Labs  - PT/OT/WBAT  - Dispo: Home today

## 2025-01-21 NOTE — DISCHARGE NOTE PROVIDER - NSDCMRMEDTOKEN_GEN_ALL_CORE_FT
acetaminophen 325 mg oral tablet: 3 tab(s) orally every 8 hours  aspirin 81 mg oral delayed release tablet: 1 tab(s) orally 2 times a day  atorvastatin 20 mg oral tablet: 1 tab(s) orally once a day  celecoxib 200 mg oral capsule: 1 cap(s) orally every 12 hours  Hiprex 1 g oral tablet: 1 tab(s) orally 2 times a day  Lyrica 150 mg oral capsule: 1 cap(s) orally 3 times a day  metFORMIN 1000 mg oral tablet: 1 tab(s) orally 2 times a day  methenamine hippurate 1 g oral tablet: 1 tab(s) orally 2 times a day  metoprolol succinate 25 mg oral tablet, extended release: 1 tab(s) orally once a day  Mounjaro 15 mg/0.5 mL subcutaneous solution: 15 milligram(s) subcutaneously once a week  multivitamin: 1 tab(s) orally once a day  Narcan 4 mg/0.1 mL nasal spray: 1 spray(s) intranasally once a day  oxyCODONE 5 mg oral tablet: 1 tab(s) orally every 4 hours as needed for Moderate Pain (4 - 6) MDD: 4  pantoprazole 40 mg oral delayed release tablet: 1 tab(s) orally once a day  polyethylene glycol 3350 oral powder for reconstitution: 17 gram(s) orally once a day (at bedtime)  senna leaf extract oral tablet: 2 tab(s) orally once a day (at bedtime)  telmisartan-hydrochlorothiazide 80 mg-12.5 mg oral tablet: 1 tab(s) orally once a day  traMADol 50 mg oral tablet: 1 tab(s) orally every 6 hours as needed for Mild Pain (1 - 3) MDD: 4  Vitamin B12 1000 mcg oral tablet: 1 tab(s) orally once a day

## 2025-01-21 NOTE — DISCHARGE NOTE NURSING/CASE MANAGEMENT/SOCIAL WORK - PATIENT PORTAL LINK FT
You can access the FollowMyHealth Patient Portal offered by [facility] by registering at the following website: [email or web address]. By joining [employer]’s FollowMyHealth portal, you will also be able to view your health information using other applications (apps) compatible with our system.

## 2025-01-21 NOTE — DISCHARGE NOTE PROVIDER - CARE PROVIDER_API CALL
Tiffanie Garcia  Orthopaedic Surgery  [address], [address]  [place] [address]  Phone: [phone]  Fax: [phone]  Follow Up Time:

## 2025-01-21 NOTE — DISCHARGE NOTE NURSING/CASE MANAGEMENT/SOCIAL WORK - NSDCPEFALRISK_GEN_ALL_CORE
For information on Fall & Injury Prevention, visit: [email or web address] OR  [email or web address] OR  https://www.cdc.gov/steadi/patient.html

## 2025-01-21 NOTE — PROGRESS NOTE ADULT - SUBJECTIVE AND OBJECTIVE BOX
Orthopedics Progress Note    Patient was seen and examined at bedside. Denies CP/SOB/Dizziness/N/V/D/HA. Pain is well controlled at the moment. Notes she worked well yesterday with physical therapy.     Vital Signs Last 24 Hrs  T(C): 36.4 (21 Jan 2025 05:20), Max: 36.8 (20 Jan 2025 09:35)  T(F): 97.6 (21 Jan 2025 05:20), Max: 98.2 (20 Jan 2025 09:35)  HR: 63 (21 Jan 2025 05:20) (60 - 76)  BP: 132/60 (21 Jan 2025 05:20) (121/43 - 148/59)  BP(mean): 82 (20 Jan 2025 13:00) (65 - 84)  RR: 18 (21 Jan 2025 05:20) (12 - 21)  SpO2: 97% (21 Jan 2025 05:20) (93% - 100%)    Parameters below as of 21 Jan 2025 05:20  Patient On (Oxygen Delivery Method): room air          Labs: follow up AM labs      Physical Exam:  Gen: NAD  RLE:   Dressing C/D/I  Motor intact + EHL/FHL/TA/GS. Sensation is grossly intact.   Compartments are soft, extremities are warm, DP 2+

## 2025-01-21 NOTE — DISCHARGE NOTE NURSING/CASE MANAGEMENT/SOCIAL WORK - NSDCVIVACCINE_GEN_ALL_CORE_FT
influenza, injectable, quadrivalent, preservative free; 29-Dec-2014 12:53; Mavis Rabago (MANOJ); [employer]; [id number]; IntraMuscular; Deltoid Right.; 0.5 milliLiter(s);

## 2025-01-21 NOTE — DISCHARGE NOTE PROVIDER - HOSPITAL COURSE
This is a 75yo female with PMH of DM, psoriasis, neuropathy who presents to [facility] for orthopedic surgery. Patient s/p R TKA with Dr. Garcia on 1/20. Patient tolerated the procedure well without any intraoperative complications. Patient tolerated physical therapy well, and the pain was controlled. Patient is weight bearing as tolerated with cane/walker as needed. Seen by medical attending for continuity of care and management and cleared for safe discharge. Keep dressing/incision clean, dry and intact. Any suture/staples to be removed on post-op day #14 at your office visit. Patient is on 81 mg of aspirin for DVT prophylaxis, please take for 6 weeks unless otherwise instructed by your surgeon. Please follow up with Dr. Garcia in 2 weeks, call the office to make an appointment, [phone]. Please follow up with your PMD for continuity of care and management as medications may have changed.

## 2025-01-21 NOTE — DISCHARGE NOTE NURSING/CASE MANAGEMENT/SOCIAL WORK - NSDCPNINST_GEN_ALL_CORE
Notify MD of temp 101, chills, increased pain not relieved with pain medication or increased drainage at incision site.   Make follow up appointment with Dr. Garcia.  If taking pain medication, make sure to take over the counter stool softeners due to constipation being a major side effect. Notify MD of temp 101, chills, increased pain not relieved with pain medication or increased drainage at incision site.   Your post operative appointment with Dr. Garcia is on February 6th at 1 PM at [address] [place] [address].   If taking pain medication, make sure to take over the counter stool softeners due to constipation being a major side effect.

## 2025-01-21 NOTE — DISCHARGE NOTE NURSING/CASE MANAGEMENT/SOCIAL WORK - FINANCIAL ASSISTANCE
[facility] provides services at a reduced cost to those who are determined to be eligible through [facility]’s financial assistance program. Information regarding [facility]’s financial assistance program can be found by going to [email or web address] or by calling [phone].

## 2025-01-26 ENCOUNTER — EMERGENCY (EMERGENCY)
Facility: HOSPITAL | Age: 75
LOS: 1 days | Discharge: HOME | End: 2025-01-26
Attending: EMERGENCY MEDICINE | Admitting: EMERGENCY MEDICINE
Payer: MEDICARE

## 2025-01-26 VITALS
RESPIRATION RATE: 16 BRPM | DIASTOLIC BLOOD PRESSURE: 55 MMHG | SYSTOLIC BLOOD PRESSURE: 158 MMHG | HEART RATE: 83 BPM | TEMPERATURE: 98 F | OXYGEN SATURATION: 98 %

## 2025-01-26 VITALS
SYSTOLIC BLOOD PRESSURE: 179 MMHG | DIASTOLIC BLOOD PRESSURE: 74 MMHG | OXYGEN SATURATION: 97 % | HEIGHT: 63 IN | TEMPERATURE: 98 F | RESPIRATION RATE: 16 BRPM | WEIGHT: 214.95 LBS | HEART RATE: 76 BPM

## 2025-01-26 DIAGNOSIS — Z90.3 ACQUIRED ABSENCE OF STOMACH [PART OF]: Chronic | ICD-10-CM

## 2025-01-26 DIAGNOSIS — Z98.1 ARTHRODESIS STATUS: Chronic | ICD-10-CM

## 2025-01-26 DIAGNOSIS — H26.9 UNSPECIFIED CATARACT: Chronic | ICD-10-CM

## 2025-01-26 LAB
ALBUMIN SERPL ELPH-MCNC: 3.7 G/DL — SIGNIFICANT CHANGE UP (ref 3.3–5)
ALP SERPL-CCNC: 82 U/L — SIGNIFICANT CHANGE UP (ref 40–120)
ALT FLD-CCNC: 8 U/L — SIGNIFICANT CHANGE UP (ref 4–33)
ANION GAP SERPL CALC-SCNC: 12 MMOL/L — SIGNIFICANT CHANGE UP (ref 7–14)
AST SERPL-CCNC: 14 U/L — SIGNIFICANT CHANGE UP (ref 4–32)
BASOPHILS # BLD AUTO: 0.05 K/UL — SIGNIFICANT CHANGE UP (ref 0–0.2)
BASOPHILS NFR BLD AUTO: 0.5 % — SIGNIFICANT CHANGE UP (ref 0–2)
BILIRUB SERPL-MCNC: 0.6 MG/DL — SIGNIFICANT CHANGE UP (ref 0.2–1.2)
BUN SERPL-MCNC: 23 MG/DL — SIGNIFICANT CHANGE UP (ref 7–23)
CALCIUM SERPL-MCNC: 9.5 MG/DL — SIGNIFICANT CHANGE UP (ref 8.4–10.5)
CHLORIDE SERPL-SCNC: 101 MMOL/L — SIGNIFICANT CHANGE UP (ref 98–107)
CO2 SERPL-SCNC: 27 MMOL/L — SIGNIFICANT CHANGE UP (ref 22–31)
CREAT SERPL-MCNC: 0.77 MG/DL — SIGNIFICANT CHANGE UP (ref 0.5–1.3)
CRP SERPL-MCNC: 76.3 MG/L — HIGH
EGFR: 81 ML/MIN/1.73M2 — SIGNIFICANT CHANGE UP
EOSINOPHIL # BLD AUTO: 0.15 K/UL — SIGNIFICANT CHANGE UP (ref 0–0.5)
EOSINOPHIL NFR BLD AUTO: 1.6 % — SIGNIFICANT CHANGE UP (ref 0–6)
ERYTHROCYTE [SEDIMENTATION RATE] IN BLOOD: 92 MM/HR — HIGH (ref 4–25)
GLUCOSE SERPL-MCNC: 124 MG/DL — HIGH (ref 70–99)
HCT VFR BLD CALC: 32.3 % — LOW (ref 34.5–45)
HGB BLD-MCNC: 9.6 G/DL — LOW (ref 11.5–15.5)
IANC: 7.22 K/UL — SIGNIFICANT CHANGE UP (ref 1.8–7.4)
IMM GRANULOCYTES NFR BLD AUTO: 0.2 % — SIGNIFICANT CHANGE UP (ref 0–0.9)
LYMPHOCYTES # BLD AUTO: 1.55 K/UL — SIGNIFICANT CHANGE UP (ref 1–3.3)
LYMPHOCYTES # BLD AUTO: 16.2 % — SIGNIFICANT CHANGE UP (ref 13–44)
MAGNESIUM SERPL-MCNC: 1.5 MG/DL — LOW (ref 1.6–2.6)
MCHC RBC-ENTMCNC: 27.4 PG — SIGNIFICANT CHANGE UP (ref 27–34)
MCHC RBC-ENTMCNC: 29.7 G/DL — LOW (ref 32–36)
MCV RBC AUTO: 92.3 FL — SIGNIFICANT CHANGE UP (ref 80–100)
MONOCYTES # BLD AUTO: 0.55 K/UL — SIGNIFICANT CHANGE UP (ref 0–0.9)
MONOCYTES NFR BLD AUTO: 5.8 % — SIGNIFICANT CHANGE UP (ref 2–14)
NEUTROPHILS # BLD AUTO: 7.22 K/UL — SIGNIFICANT CHANGE UP (ref 1.8–7.4)
NEUTROPHILS NFR BLD AUTO: 75.7 % — SIGNIFICANT CHANGE UP (ref 43–77)
NRBC # BLD: 0 /100 WBCS — SIGNIFICANT CHANGE UP (ref 0–0)
NRBC # FLD: 0 K/UL — SIGNIFICANT CHANGE UP (ref 0–0)
PHOSPHATE SERPL-MCNC: 2.5 MG/DL — SIGNIFICANT CHANGE UP (ref 2.5–4.5)
PLATELET # BLD AUTO: 249 K/UL — SIGNIFICANT CHANGE UP (ref 150–400)
POTASSIUM SERPL-MCNC: 4.3 MMOL/L — SIGNIFICANT CHANGE UP (ref 3.5–5.3)
POTASSIUM SERPL-SCNC: 4.3 MMOL/L — SIGNIFICANT CHANGE UP (ref 3.5–5.3)
PROT SERPL-MCNC: 7.1 G/DL — SIGNIFICANT CHANGE UP (ref 6–8.3)
RBC # BLD: 3.5 M/UL — LOW (ref 3.8–5.2)
RBC # FLD: 14.5 % — SIGNIFICANT CHANGE UP (ref 10.3–14.5)
SODIUM SERPL-SCNC: 140 MMOL/L — SIGNIFICANT CHANGE UP (ref 135–145)
WBC # BLD: 9.54 K/UL — SIGNIFICANT CHANGE UP (ref 3.8–10.5)
WBC # FLD AUTO: 9.54 K/UL — SIGNIFICANT CHANGE UP (ref 3.8–10.5)

## 2025-01-26 PROCEDURE — 99285 EMERGENCY DEPT VISIT HI MDM: CPT

## 2025-01-26 PROCEDURE — 73590 X-RAY EXAM OF LOWER LEG: CPT | Mod: 26,RT

## 2025-01-26 PROCEDURE — 73562 X-RAY EXAM OF KNEE 3: CPT | Mod: 26,RT

## 2025-01-26 PROCEDURE — 93971 EXTREMITY STUDY: CPT | Mod: 26,LT

## 2025-01-26 RX ORDER — MAGNESIUM SULFATE 500 MG/ML
2 INJECTION, SOLUTION INTRAMUSCULAR; INTRAVENOUS ONCE
Refills: 0 | Status: COMPLETED | OUTPATIENT
Start: 2025-01-26 | End: 2025-01-26

## 2025-01-26 RX ORDER — MORPHINE SULFATE 15 MG
4 TABLET, EXTENDED RELEASE ORAL ONCE
Refills: 0 | Status: DISCONTINUED | OUTPATIENT
Start: 2025-01-26 | End: 2025-01-26

## 2025-01-26 RX ORDER — MORPHINE SULFATE 15 MG
2 TABLET, EXTENDED RELEASE ORAL ONCE
Refills: 0 | Status: DISCONTINUED | OUTPATIENT
Start: 2025-01-26 | End: 2025-01-26

## 2025-01-26 RX ADMIN — Medication 2 MILLIGRAM(S): at 21:25

## 2025-01-26 RX ADMIN — Medication 4 MILLIGRAM(S): at 22:26

## 2025-01-26 RX ADMIN — MAGNESIUM SULFATE 25 GRAM(S): 500 INJECTION, SOLUTION INTRAMUSCULAR; INTRAVENOUS at 22:59

## 2025-01-26 NOTE — ED PROVIDER NOTE - ATTENDING CONTRIBUTION TO CARE
Attending note:   After face to face evaluation of this patient, I concur with above noted hx, pe, and care plan for this patient. Ochoa: 74-year-old female with history of hypertension, hyperlipidemia and diabetes.  Patient had a right knee replacement 6 days ago and discharged 5 days ago.  Patient notes today had significant worsening right knee pain and right leg swelling.  Pain radiating up to the hip.  Patient denies any fevers or chills.  Patient denies any chest pain or shortness of breath.  Patient has been given baby aspirin to prevent blood clots.  On exam patient's vital signs elevated blood pressure but normal heart rate and patient is afebrile.  Lungs are clear heart is regular.  There is ecchymosis in the dependent areas of the thigh as well as some tense edema in the pretibial and calf areas.  There is also some redness noted more streak-like pattern.  There is minimal warmth.  Dressing is intact without any discharge or blood.  Patient has limited range of motion.  Patient appears uncomfortable but nontoxic.  Pulses and sensation and distal movement from ankle down are normal.  Concern for DVT versus movement of hardware first less likely infection.  Awaiting ultrasound, x-rays labs and pain medication.

## 2025-01-26 NOTE — ED PROVIDER NOTE - PATIENT PORTAL LINK FT
You can access the FollowMyHealth Patient Portal offered by [facility] by registering at the following website: [email or web address]. By joining [employer]’s FollowMyHealth portal, you will also be able to view your health information using other applications (apps) compatible with our system.

## 2025-01-26 NOTE — ED ADULT NURSE NOTE - OBJECTIVE STATEMENT
Pt arrives to ED rm 23 A&Ox4 and ambulatory c/o swelling and pain to RLE. Pt recently had a R complete knee replacement on 1/20. Since surgery was conducted the pt was conducting the exercises as tolerated, but they have been experiencing worsening swelling and bruising. R leg has +2 pitting edema with bruising surrounding surgery site. Pt denies fevers or chills. Respirations are even and unlabored. Site is dressed. 20g placed to LAC, labs drawn and sent. Medicated as ordered
Initial (On Arrival)

## 2025-01-26 NOTE — ED PROVIDER NOTE - OBJECTIVE STATEMENT
75yo F hx of HTN, HLD, DM recently had right knee replacement surgery on 1/20, presenting with severe right knee pain and right lower extremity swelling. Pt notes pain has been present since knee replacement and has increased throughout the week as swelling increased. Pain is worst in the medial aspect of the knee and with warmth and redness. Does endorse nausea today, no vomiting. Denies chest pain, sob, headaches, dizziness, lightheadedness, diarrhea, abd pain, LLE pain or swelling.

## 2025-01-26 NOTE — CONSULT NOTE ADULT - SUBJECTIVE AND OBJECTIVE BOX
HPI  74y Female w/ DM2, obesity, c/o increasing atraumatic R knee and generalized R leg pain. Underwent primary R total knee replacement with Dr. Garcia 6 days ago on 1/20/25. Able to bear weight in the RLE with her walker with pain. Worked with her physical therapist Tuesday through Friday. Has been taking her discharge medications as prescribed. Denies fevers, endorses some subjective chills/nausea today. Denies numbness/tingling in the RLE.       ROS  Negative unless otherwise specified in HPI.    PAST MEDICAL & SURGICAL Hx  PAST MEDICAL & SURGICAL HISTORY:  HTN (Hypertension) (ICD9 401.9)      Morbid Obesity (ICD9 278.01)      Hyperlipemia      Degenerative disc disease, lumbar      Renal calculi      GERD (gastroesophageal reflux disease)      Type 2 diabetes mellitus      Psoriasis      Peripheral neuropathy      History of sciatica      Cryptococcus      COVID-19      S/P Appendectomy (ICD9 V45.89)      S/P Tonsillectomy (ICD9 V45.89)      S/P Cholecystectomy (ICD9 V45.79)      S/P Thoracotomy (ICD9 V45.89)  left lung for mass (cryptococcus) 2009      Renal Calculi  Percutaneous Stone Extraction(ICD9 592.0)  Right and Left      Finger Swelling/Mass (ICD9 729.81)  Left Index      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      Bilateral cataracts  2014      H/O spinal fusion          MEDICATIONS  Home Medications:  acetaminophen 325 mg oral tablet: 3 tab(s) orally every 8 hours (21 Jan 2025 09:49)  atorvastatin 20 mg oral tablet: 1 tab(s) orally once a day (20 Jan 2025 06:21)  Hiprex 1 g oral tablet: 1 tab(s) orally 2 times a day (20 Jan 2025 06:21)  Lyrica 150 mg oral capsule: 1 cap(s) orally 3 times a day (20 Jan 2025 06:21)  metFORMIN 1000 mg oral tablet: 1 tab(s) orally 2 times a day (20 Jan 2025 06:21)  methenamine hippurate 1 g oral tablet: 1 tab(s) orally 2 times a day (21 Jan 2025 09:49)  metoprolol succinate 25 mg oral tablet, extended release: 1 tab(s) orally once a day (20 Jan 2025 06:21)  Mounjaro 15 mg/0.5 mL subcutaneous solution: 15 milligram(s) subcutaneously once a week (20 Jan 2025 06:21)  multivitamin: 1 tab(s) orally once a day (20 Jan 2025 06:21)  pantoprazole 40 mg oral delayed release tablet: 1 tab(s) orally once a day (20 Jan 2025 06:21)  telmisartan-hydrochlorothiazide 80 mg-12.5 mg oral tablet: 1 tab(s) orally once a day (20 Jan 2025 06:21)  Vitamin B12 1000 mcg oral tablet: 1 tab(s) orally once a day (20 Jan 2025 06:21)      ALLERGIES  allopurinol (Rash)      FAMILY Hx  FAMILY HISTORY:  FH: breast cancer (Mother)    Family history of cervical cancer (Mother)    FH: CAD (coronary artery disease) (Father)        SOCIAL Hx  Social History:      VITALS  Vital Signs Last 24 Hrs  T(C): 36.4 (26 Jan 2025 19:01), Max: 36.4 (26 Jan 2025 19:01)  T(F): 97.6 (26 Jan 2025 19:01), Max: 97.6 (26 Jan 2025 19:01)  HR: 76 (26 Jan 2025 19:01) (76 - 76)  BP: 179/74 (26 Jan 2025 19:01) (179/74 - 179/74)  BP(mean): --  RR: 16 (26 Jan 2025 19:01) (16 - 16)  SpO2: 97% (26 Jan 2025 19:01) (97% - 97%)        PHYSICAL EXAM  Gen: Lying in bed, non-toxic appearing, NAD  Resp: No increased WOB  RLE:  Aquacel dressing c/d/i, mild strikethrough at proximal edge   Mild knee swelling, ecchymosis posteriorly   Leg diffusely tender to palpation   Compartments soft  R knee active ROM 0-20, limited 2/2 pain  Motor: TA/EHL/GS/FHL intact  Sensory: DP/SP/Tib/Danni/Saph SILT  +DP pulse, WWP      LABS                  IMAGING  XRs: s/p R TKA, no evidence of fracture or loosening (personal read)     HPI  74y Female w/ DM2, obesity, c/o increasing atraumatic R knee and generalized R leg pain. Underwent primary R total knee replacement with Dr. Garcia 6 days ago on 1/20/25. Able to bear weight in the RLE with her walker with pain. Worked with her physical therapist Tuesday through Friday. Has been taking her discharge medications as prescribed. Denies fevers, endorses some subjective chills/nausea today. Denies numbness/tingling in the RLE.       ROS  Negative unless otherwise specified in HPI.    PAST MEDICAL & SURGICAL Hx  PAST MEDICAL & SURGICAL HISTORY:  HTN (Hypertension) (ICD9 401.9)      Morbid Obesity (ICD9 278.01)      Hyperlipemia      Degenerative disc disease, lumbar      Renal calculi      GERD (gastroesophageal reflux disease)      Type 2 diabetes mellitus      Psoriasis      Peripheral neuropathy      History of sciatica      Cryptococcus      COVID-19      S/P Appendectomy (ICD9 V45.89)      S/P Tonsillectomy (ICD9 V45.89)      S/P Cholecystectomy (ICD9 V45.79)      S/P Thoracotomy (ICD9 V45.89)  left lung for mass (cryptococcus) 2009      Renal Calculi  Percutaneous Stone Extraction(ICD9 592.0)  Right and Left      Finger Swelling/Mass (ICD9 729.81)  Left Index      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      Bilateral cataracts  2014      H/O spinal fusion          MEDICATIONS  Home Medications:  acetaminophen 325 mg oral tablet: 3 tab(s) orally every 8 hours (21 Jan 2025 09:49)  atorvastatin 20 mg oral tablet: 1 tab(s) orally once a day (20 Jan 2025 06:21)  Hiprex 1 g oral tablet: 1 tab(s) orally 2 times a day (20 Jan 2025 06:21)  Lyrica 150 mg oral capsule: 1 cap(s) orally 3 times a day (20 Jan 2025 06:21)  metFORMIN 1000 mg oral tablet: 1 tab(s) orally 2 times a day (20 Jan 2025 06:21)  methenamine hippurate 1 g oral tablet: 1 tab(s) orally 2 times a day (21 Jan 2025 09:49)  metoprolol succinate 25 mg oral tablet, extended release: 1 tab(s) orally once a day (20 Jan 2025 06:21)  Mounjaro 15 mg/0.5 mL subcutaneous solution: 15 milligram(s) subcutaneously once a week (20 Jan 2025 06:21)  multivitamin: 1 tab(s) orally once a day (20 Jan 2025 06:21)  pantoprazole 40 mg oral delayed release tablet: 1 tab(s) orally once a day (20 Jan 2025 06:21)  telmisartan-hydrochlorothiazide 80 mg-12.5 mg oral tablet: 1 tab(s) orally once a day (20 Jan 2025 06:21)  Vitamin B12 1000 mcg oral tablet: 1 tab(s) orally once a day (20 Jan 2025 06:21)      ALLERGIES  allopurinol (Rash)      FAMILY Hx  FAMILY HISTORY:  FH: breast cancer (Mother)    Family history of cervical cancer (Mother)    FH: CAD (coronary artery disease) (Father)        SOCIAL Hx  Social History:      VITALS  Vital Signs Last 24 Hrs  T(C): 36.4 (26 Jan 2025 19:01), Max: 36.4 (26 Jan 2025 19:01)  T(F): 97.6 (26 Jan 2025 19:01), Max: 97.6 (26 Jan 2025 19:01)  HR: 76 (26 Jan 2025 19:01) (76 - 76)  BP: 179/74 (26 Jan 2025 19:01) (179/74 - 179/74)  BP(mean): --  RR: 16 (26 Jan 2025 19:01) (16 - 16)  SpO2: 97% (26 Jan 2025 19:01) (97% - 97%)        PHYSICAL EXAM  Gen: Lying in bed, non-toxic appearing, NAD  Resp: No increased WOB  RLE:  Aquacel dressing c/d/i, mild strikethrough at proximal edge   Mild knee swelling, ecchymosis posteriorly   Leg diffusely tender to palpation   Compartments soft  Able to straight leg raise  R knee active ROM 0-20, limited 2/2 pain  Motor: TA/EHL/GS/FHL intact  Sensory: DP/SP/Tib/Danni/Saph SILT  +DP pulse, WWP      LABS                  IMAGING  XRs: s/p R TKA, no evidence of fracture or implant loosening (personal read)

## 2025-01-26 NOTE — ED ADULT NURSE REASSESSMENT NOTE - NS ED NURSE REASSESS COMMENT FT1
Pt resting comfortably in stretcher, endorsing improved pain. respirations are even and unlabored. Magnesium started as ordered. Pt pending radiology and lab results

## 2025-01-26 NOTE — ED ADULT NURSE NOTE - NSFALLRISKINTERV_ED_ALL_ED

## 2025-01-26 NOTE — ED ADULT NURSE NOTE - HIV OFFER
pt home eye drop levobunolol not found at the bedside. Is there an alternative that can be ordered?
Opt out

## 2025-01-26 NOTE — ED ADULT TRIAGE NOTE - CHIEF COMPLAINT QUOTE
pt c/o severe pain in rt knee pt had surgery for knee replacement on monday with pain to thigh area too

## 2025-01-26 NOTE — ED PROVIDER NOTE - PROGRESS NOTE DETAILS
Pain improved after morphine 2mg and 4mg. RLE DVT study negative. No leukocytosis. Ortho evaluated and recommended follow up outpatient. Pt able to ambulate with assistance which is her baseline. Pt to be discharged.

## 2025-01-26 NOTE — ED PROVIDER NOTE - CARE PROVIDER_API CALL
Tiffanie Garcia  Orthopaedic Surgery  [address], [address]  [place] [address]  Phone: [phone]  Fax: [phone]  Follow Up Time: Urgent

## 2025-01-26 NOTE — CONSULT NOTE ADULT - CONSULT REASON
Chonodrocutaneous Helical Advancement Flap Text: The defect edges were debeveled with a #15 scalpel blade.  Given the location of the defect and the proximity to free margins a chondrocutaneous helical advancement flap was deemed most appropriate.  Using a sterile surgical marker, the appropriate advancement flap was drawn incorporating the defect and placing the expected incisions within the relaxed skin tension lines where possible.    The area thus outlined was incised deep to adipose tissue with a #15 scalpel blade.  The skin margins were undermined to an appropriate distance in all directions utilizing iris scissors. R leg pain sp R TKA Additional Anesthesia Volume In Cc: 6 Crescentic Intermediate Repair Preamble Text (Leave Blank If You Do Not Want): Undermining was performed with blunt dissection. Complex Repair And O-T Advancement Flap Text: The defect edges were debeveled with a #15 scalpel blade.  The primary defect was closed partially with a complex linear closure.  Given the location of the remaining defect, shape of the defect and the proximity to free margins an O-T advancement flap was deemed most appropriate for complete closure of the defect.  Using a sterile surgical marker, an appropriate advancement flap was drawn incorporating the defect and placing the expected incisions within the relaxed skin tension lines where possible.    The area thus outlined was incised deep to adipose tissue with a #15 scalpel blade.  The skin margins were undermined to an appropriate distance in all directions utilizing iris scissors. Validate Note Data (See Information Below): Yes H Plasty Text: Given the location of the defect, shape of the defect and the proximity to free margins a H-plasty was deemed most appropriate for repair.  Using a sterile surgical marker, the appropriate advancement arms of the H-plasty were drawn incorporating the defect and placing the expected incisions within the relaxed skin tension lines where possible. The area thus outlined was incised deep to adipose tissue with a #15 scalpel blade. The skin margins were undermined to an appropriate distance in all directions utilizing iris scissors.  The opposing advancement arms were then advanced into place in opposite direction and anchored with interrupted buried subcutaneous sutures. Nasalis-Muscle-Based Myocutaneous Island Pedicle Flap Text: Using a #15 blade, an incision was made around the donor flap to the level of the nasalis muscle. Wide lateral undermining was then performed in both the subcutaneous plane above the nasalis muscle, and in a submuscular plane just above periosteum. This allowed the formation of a free nasalis muscle axial pedicle (based on the angular artery) which was still attached to the actual cutaneous flap, increasing its mobility and vascular viability. Hemostasis was obtained with pinpoint electrocoagulation. The flap was mobilized into position and the pivotal anchor points positioned and stabilized with buried interrupted sutures. Subcutaneous and dermal tissues were closed in a multilayered fashion with sutures. Tissue redundancies were excised, and the epidermal edges were apposed without significant tension and sutured with sutures. Suturegard Retention Suture: 2-0 Nylon Skin Substitute Units (Will Override Primary Defect Units If Greater Than 0): 0 Graft Donor Site Bandage (Optional-Leave Blank If You Don't Want In Note): Steri-strips and a pressure bandage were applied to the donor site. Mastoid Interpolation Flap Text: A decision was made to reconstruct the defect utilizing an interpolation axial flap and a staged reconstruction.  A telfa template was made of the defect.  This telfa template was then used to outline the mastoid interpolation flap.  The donor area for the pedicle flap was then injected with anesthesia.  The flap was excised through the skin and subcutaneous tissue down to the layer of the underlying musculature.  The pedicle flap was carefully excised within this deep plane to maintain its blood supply.  The edges of the donor site were undermined.   The donor site was closed in a primary fashion.  The pedicle was then rotated into position and sutured.  Once the tube was sutured into place, adequate blood supply was confirmed with blanching and refill.  The pedicle was then wrapped with xeroform gauze and dressed appropriately with a telfa and gauze bandage to ensure continued blood supply and protect the attached pedicle. Complex Repair And M Plasty Text: The defect edges were debeveled with a #15 scalpel blade.  The primary defect was closed partially with a complex linear closure.  Given the location of the remaining defect, shape of the defect and the proximity to free margins an M plasty was deemed most appropriate for complete closure of the defect.  Using a sterile surgical marker, an appropriate advancement flap was drawn incorporating the defect and placing the expected incisions within the relaxed skin tension lines where possible.    The area thus outlined was incised deep to adipose tissue with a #15 scalpel blade.  The skin margins were undermined to an appropriate distance in all directions utilizing iris scissors. Trilobed Flap Text: The defect edges were debeveled with a #15 scalpel blade.  Given the location of the defect and the proximity to free margins a trilobed flap was deemed most appropriate.  Using a sterile surgical marker, an appropriate trilobed flap drawn around the defect.    The area thus outlined was incised deep to adipose tissue with a #15 scalpel blade.  The skin margins were undermined to an appropriate distance in all directions utilizing iris scissors. Complex Repair And Dorsal Nasal Flap Text: The defect edges were debeveled with a #15 scalpel blade.  The primary defect was closed partially with a complex linear closure.  Given the location of the remaining defect, shape of the defect and the proximity to free margins a dorsal nasal flap was deemed most appropriate for complete closure of the defect.  Using a sterile surgical marker, an appropriate flap was drawn incorporating the defect and placing the expected incisions within the relaxed skin tension lines where possible.    The area thus outlined was incised deep to adipose tissue with a #15 scalpel blade.  The skin margins were undermined to an appropriate distance in all directions utilizing iris scissors. Repair Performed By Another Provider Text (Leave Blank If You Do Not Want): After the tissue was excised the defect was repaired by another provider. Helical Rim Text: The closure involved the helical rim. Bill For Surgical Tray: no Anesthesia Type: 1% lidocaine with epinephrine Complex Repair And Single Advancement Flap Text: The defect edges were debeveled with a #15 scalpel blade.  The primary defect was closed partially with a complex linear closure.  Given the location of the remaining defect, shape of the defect and the proximity to free margins a single advancement flap was deemed most appropriate for complete closure of the defect.  Using a sterile surgical marker, an appropriate advancement flap was drawn incorporating the defect and placing the expected incisions within the relaxed skin tension lines where possible.    The area thus outlined was incised deep to adipose tissue with a #15 scalpel blade.  The skin margins were undermined to an appropriate distance in all directions utilizing iris scissors. Hatchet Flap Text: The defect edges were debeveled with a #15 scalpel blade.  Given the location of the defect, shape of the defect and the proximity to free margins a hatchet flap was deemed most appropriate.  Using a sterile surgical marker, an appropriate hatchet flap was drawn incorporating the defect and placing the expected incisions within the relaxed skin tension lines where possible.    The area thus outlined was incised deep to adipose tissue with a #15 scalpel blade.  The skin margins were undermined to an appropriate distance in all directions utilizing iris scissors. Complex Repair And A-T Advancement Flap Text: The defect edges were debeveled with a #15 scalpel blade.  The primary defect was closed partially with a complex linear closure.  Given the location of the remaining defect, shape of the defect and the proximity to free margins an A-T advancement flap was deemed most appropriate for complete closure of the defect.  Using a sterile surgical marker, an appropriate advancement flap was drawn incorporating the defect and placing the expected incisions within the relaxed skin tension lines where possible.    The area thus outlined was incised deep to adipose tissue with a #15 scalpel blade.  The skin margins were undermined to an appropriate distance in all directions utilizing iris scissors. V-Y Plasty Text: The defect edges were debeveled with a #15 scalpel blade.  Given the location of the defect, shape of the defect and the proximity to free margins an V-Y advancement flap was deemed most appropriate.  Using a sterile surgical marker, an appropriate advancement flap was drawn incorporating the defect and placing the expected incisions within the relaxed skin tension lines where possible.    The area thus outlined was incised deep to adipose tissue with a #15 scalpel blade.  The skin margins were undermined to an appropriate distance in all directions utilizing iris scissors. Excision Depth: adipose tissue Fusiform Excision Additional Text (Leave Blank If You Do Not Want): The margin was drawn around the clinically apparent lesion.  A fusiform shape was then drawn on the skin incorporating the lesion and margins.  Incisions were then made along these lines to the appropriate tissue plane and the lesion was extirpated. Melolabial Interpolation Flap Text: A decision was made to reconstruct the defect utilizing an interpolation axial flap and a staged reconstruction.  A telfa template was made of the defect.  This telfa template was then used to outline the melolabial interpolation flap.  The donor area for the pedicle flap was then injected with anesthesia.  The flap was excised through the skin and subcutaneous tissue down to the layer of the underlying musculature.  The pedicle flap was carefully excised within this deep plane to maintain its blood supply.  The edges of the donor site were undermined.   The donor site was closed in a primary fashion.  The pedicle was then rotated into position and sutured.  Once the tube was sutured into place, adequate blood supply was confirmed with blanching and refill.  The pedicle was then wrapped with xeroform gauze and dressed appropriately with a telfa and gauze bandage to ensure continued blood supply and protect the attached pedicle. Bi-Rhombic Flap Text: The defect edges were debeveled with a #15 scalpel blade.  Given the location of the defect and the proximity to free margins a bi-rhombic flap was deemed most appropriate.  Using a sterile surgical marker, an appropriate rhombic flap was drawn incorporating the defect. The area thus outlined was incised deep to adipose tissue with a #15 scalpel blade.  The skin margins were undermined to an appropriate distance in all directions utilizing iris scissors. Complex Repair And Epidermal Autograft Text: The defect edges were debeveled with a #15 scalpel blade.  The primary defect was closed partially with a complex linear closure.  Given the location of the defect, shape of the defect and the proximity to free margins an epidermal autograft was deemed most appropriate to repair the remaining defect.  The graft was trimmed to fit the size of the remaining defect.  The graft was then placed in the primary defect, oriented appropriately, and sutured into place. O-Z Flap Text: The defect edges were debeveled with a #15 scalpel blade.  Given the location of the defect, shape of the defect and the proximity to free margins an O-Z flap was deemed most appropriate.  Using a sterile surgical marker, an appropriate transposition flap was drawn incorporating the defect and placing the expected incisions within the relaxed skin tension lines where possible. The area thus outlined was incised deep to adipose tissue with a #15 scalpel blade.  The skin margins were undermined to an appropriate distance in all directions utilizing iris scissors. Hemigard Postcare Instructions: The HEMIGARD strips are to remain completely dry for at least 5-7 days. Double Island Pedicle Flap Text: The defect edges were debeveled with a #15 scalpel blade.  Given the location of the defect, shape of the defect and the proximity to free margins a double island pedicle advancement flap was deemed most appropriate.  Using a sterile surgical marker, an appropriate advancement flap was drawn incorporating the defect, outlining the appropriate donor tissue and placing the expected incisions within the relaxed skin tension lines where possible.    The area thus outlined was incised deep to adipose tissue with a #15 scalpel blade.  The skin margins were undermined to an appropriate distance in all directions around the primary defect and laterally outward around the island pedicle utilizing iris scissors.  There was minimal undermining beneath the pedicle flap. M-Plasty Complex Repair Preamble Text (Leave Blank If You Do Not Want): Extensive wide undermining was performed. Muscle Hinge Flap Text: The defect edges were debeveled with a #15 scalpel blade.  Given the size, depth and location of the defect and the proximity to free margins a muscle hinge flap was deemed most appropriate.  Using a sterile surgical marker, an appropriate hinge flap was drawn incorporating the defect. The area thus outlined was incised with a #15 scalpel blade.  The skin margins were undermined to an appropriate distance in all directions utilizing iris scissors. Retention Suture Bite Size: 3 mm Complex Repair And Z Plasty Text: The defect edges were debeveled with a #15 scalpel blade.  The primary defect was closed partially with a complex linear closure.  Given the location of the remaining defect, shape of the defect and the proximity to free margins a Z plasty was deemed most appropriate for complete closure of the defect.  Using a sterile surgical marker, an appropriate advancement flap was drawn incorporating the defect and placing the expected incisions within the relaxed skin tension lines where possible.    The area thus outlined was incised deep to adipose tissue with a #15 scalpel blade.  The skin margins were undermined to an appropriate distance in all directions utilizing iris scissors. Advancement Flap (Double) Text: The defect edges were debeveled with a #15 scalpel blade.  Given the location of the defect and the proximity to free margins a double advancement flap was deemed most appropriate.  Using a sterile surgical marker, the appropriate advancement flaps were drawn incorporating the defect and placing the expected incisions within the relaxed skin tension lines where possible.    The area thus outlined was incised deep to adipose tissue with a #15 scalpel blade.  The skin margins were undermined to an appropriate distance in all directions utilizing iris scissors. Lip Wedge Excision Repair Text: Given the location of the defect and the proximity to free margins a full thickness wedge repair was deemed most appropriate.  Using a sterile surgical marker, the appropriate repair was drawn incorporating the defect and placing the expected incisions perpendicular to the vermilion border.  The vermilion border was also meticulously outlined to ensure appropriate reapproximation during the repair.  The area thus outlined was incised through and through with a #15 scalpel blade.  The muscularis and dermis were reaproximated with deep sutures following hemostasis. Care was taken to realign the vermilion border before proceeding with the superficial closure.  Once the vermilion was realigned the superfical and mucosal closure was finished. Bilobed Flap Text: The defect edges were debeveled with a #15 scalpel blade.  Given the location of the defect and the proximity to free margins a bilobe flap was deemed most appropriate.  Using a sterile surgical marker, an appropriate bilobe flap drawn around the defect.    The area thus outlined was incised deep to adipose tissue with a #15 scalpel blade.  The skin margins were undermined to an appropriate distance in all directions utilizing iris scissors. Consent was obtained from the patient. The risks and benefits to therapy were discussed in detail. Specifically, the risks of infection, scarring, bleeding, prolonged wound healing, incomplete removal, allergy to anesthesia, nerve injury and recurrence were addressed. Prior to the procedure, the treatment site was clearly identified and confirmed by the patient. All components of Universal Protocol/PAUSE Rule completed. Retention Suture Text: Retention sutures were placed to support the closure and prevent dehiscence. Home Suture Removal Text: Patient was provided a home suture removal kit and will remove their sutures at home.  If they have any questions or difficulties they will call the office. Purse String (Simple) Text: Given the location of the defect and the characteristics of the surrounding skin a purse string simple closure was deemed most appropriate.  Undermining was performed circumferentially around the surgical defect.  A purse string suture was then placed and tightened. Interpolation Flap Text: A decision was made to reconstruct the defect utilizing an interpolation axial flap and a staged reconstruction.  A telfa template was made of the defect.  This telfa template was then used to outline the interpolation flap.  The donor area for the pedicle flap was then injected with anesthesia.  The flap was excised through the skin and subcutaneous tissue down to the layer of the underlying musculature.  The interpolation flap was carefully excised within this deep plane to maintain its blood supply.  The edges of the donor site were undermined.   The donor site was closed in a primary fashion.  The pedicle was then rotated into position and sutured.  Once the tube was sutured into place, adequate blood supply was confirmed with blanching and refill.  The pedicle was then wrapped with xeroform gauze and dressed appropriately with a telfa and gauze bandage to ensure continued blood supply and protect the attached pedicle. Hemigard Intro: Due to skin fragility and wound tension, it was decided to use HEMIGARD adhesive retention suture devices to permit a linear closure. The skin was cleaned and dried for a 6cm distance away from the wound. Excessive hair, if present, was removed to allow for adhesion. Nostril Rim Text: The closure involved the nostril rim. Alar Island Pedicle Flap Text: The defect edges were debeveled with a #15 scalpel blade.  Given the location of the defect, shape of the defect and the proximity to the alar rim an island pedicle advancement flap was deemed most appropriate.  Using a sterile surgical marker, an appropriate advancement flap was drawn incorporating the defect, outlining the appropriate donor tissue and placing the expected incisions within the nasal ala running parallel to the alar rim. The area thus outlined was incised with a #15 scalpel blade.  The skin margins were undermined minimally to an appropriate distance in all directions around the primary defect and laterally outward around the island pedicle utilizing iris scissors.  There was minimal undermining beneath the pedicle flap. Modified Advancement Flap Text: The defect edges were debeveled with a #15 scalpel blade.  Given the location of the defect, shape of the defect and the proximity to free margins a modified advancement flap was deemed most appropriate.  Using a sterile surgical marker, an appropriate advancement flap was drawn incorporating the defect and placing the expected incisions within the relaxed skin tension lines where possible.    The area thus outlined was incised deep to adipose tissue with a #15 scalpel blade.  The skin margins were undermined to an appropriate distance in all directions utilizing iris scissors. Dermal Autograft Text: The defect edges were debeveled with a #15 scalpel blade.  Given the location of the defect, shape of the defect and the proximity to free margins a dermal autograft was deemed most appropriate.  Using a sterile surgical marker, the primary defect shape was transferred to the donor site. The area thus outlined was incised deep to adipose tissue with a #15 scalpel blade.  The harvested graft was then trimmed of adipose and epidermal tissue until only dermis was left.  The skin graft was then placed in the primary defect and oriented appropriately. Repair Type: None Transposition Flap Text: The defect edges were debeveled with a #15 scalpel blade.  Given the location of the defect and the proximity to free margins a transposition flap was deemed most appropriate.  Using a sterile surgical marker, an appropriate transposition flap was drawn incorporating the defect.    The area thus outlined was incised deep to adipose tissue with a #15 scalpel blade.  The skin margins were undermined to an appropriate distance in all directions utilizing iris scissors. Complex Repair And Transposition Flap Text: The defect edges were debeveled with a #15 scalpel blade.  The primary defect was closed partially with a complex linear closure.  Given the location of the remaining defect, shape of the defect and the proximity to free margins a transposition flap was deemed most appropriate for complete closure of the defect.  Using a sterile surgical marker, an appropriate advancement flap was drawn incorporating the defect and placing the expected incisions within the relaxed skin tension lines where possible.    The area thus outlined was incised deep to adipose tissue with a #15 scalpel blade.  The skin margins were undermined to an appropriate distance in all directions utilizing iris scissors. Complex Repair And W Plasty Text: The defect edges were debeveled with a #15 scalpel blade.  The primary defect was closed partially with a complex linear closure.  Given the location of the remaining defect, shape of the defect and the proximity to free margins a W plasty was deemed most appropriate for complete closure of the defect.  Using a sterile surgical marker, an appropriate advancement flap was drawn incorporating the defect and placing the expected incisions within the relaxed skin tension lines where possible.    The area thus outlined was incised deep to adipose tissue with a #15 scalpel blade.  The skin margins were undermined to an appropriate distance in all directions utilizing iris scissors. Excisional Biopsy Additional Text (Leave Blank If You Do Not Want): The margin was drawn around the clinically apparent lesion. An elliptical shape was then drawn on the skin incorporating the lesion and margins.  Incisions were then made along these lines to the appropriate tissue plane and the lesion was extirpated. Banner Transposition Flap Text: The defect edges were debeveled with a #15 scalpel blade.  Given the location of the defect and the proximity to free margins a Banner transposition flap was deemed most appropriate.  Using a sterile surgical marker, an appropriate flap drawn around the defect. The area thus outlined was incised deep to adipose tissue with a #15 scalpel blade.  The skin margins were undermined to an appropriate distance in all directions utilizing iris scissors. Paramedian Forehead Flap Text: A decision was made to reconstruct the defect utilizing an interpolation axial flap and a staged reconstruction.  A telfa template was made of the defect.  This telfa template was then used to outline the paramedian forehead pedicle flap.  The donor area for the pedicle flap was then injected with anesthesia.  The flap was excised through the skin and subcutaneous tissue down to the layer of the underlying musculature.  The pedicle flap was carefully excised within this deep plane to maintain its blood supply.  The edges of the donor site were undermined.   The donor site was closed in a primary fashion.  The pedicle was then rotated into position and sutured.  Once the tube was sutured into place, adequate blood supply was confirmed with blanching and refill.  The pedicle was then wrapped with xeroform gauze and dressed appropriately with a telfa and gauze bandage to ensure continued blood supply and protect the attached pedicle. Complex Repair And Skin Substitute Graft Text: The defect edges were debeveled with a #15 scalpel blade.  The primary defect was closed partially with a complex linear closure.  Given the location of the remaining defect, shape of the defect and the proximity to free margins a skin substitute graft was deemed most appropriate to repair the remaining defect.  The graft was trimmed to fit the size of the remaining defect.  The graft was then placed in the primary defect, oriented appropriately, and sutured into place. Vermilion Border Text: The closure involved the vermilion border. A-T Advancement Flap Text: The defect edges were debeveled with a #15 scalpel blade.  Given the location of the defect, shape of the defect and the proximity to free margins an A-T advancement flap was deemed most appropriate.  Using a sterile surgical marker, an appropriate advancement flap was drawn incorporating the defect and placing the expected incisions within the relaxed skin tension lines where possible.    The area thus outlined was incised deep to adipose tissue with a #15 scalpel blade.  The skin margins were undermined to an appropriate distance in all directions utilizing iris scissors. Purse String (Intermediate) Text: Given the location of the defect and the characteristics of the surrounding skin a purse string intermediate closure was deemed most appropriate.  Undermining was performed circumferentially around the surgical defect.  A purse string suture was then placed and tightened. Complex Repair And Double Advancement Flap Text: The defect edges were debeveled with a #15 scalpel blade.  The primary defect was closed partially with a complex linear closure.  Given the location of the remaining defect, shape of the defect and the proximity to free margins a double advancement flap was deemed most appropriate for complete closure of the defect.  Using a sterile surgical marker, an appropriate advancement flap was drawn incorporating the defect and placing the expected incisions within the relaxed skin tension lines where possible.    The area thus outlined was incised deep to adipose tissue with a #15 scalpel blade.  The skin margins were undermined to an appropriate distance in all directions utilizing iris scissors. O-Z Plasty Text: The defect edges were debeveled with a #15 scalpel blade.  Given the location of the defect, shape of the defect and the proximity to free margins an O-Z plasty (double transposition flap) was deemed most appropriate.  Using a sterile surgical marker, the appropriate transposition flaps were drawn incorporating the defect and placing the expected incisions within the relaxed skin tension lines where possible.    The area thus outlined was incised deep to adipose tissue with a #15 scalpel blade.  The skin margins were undermined to an appropriate distance in all directions utilizing iris scissors.  Hemostasis was achieved with electrocautery.  The flaps were then transposed into place, one clockwise and the other counterclockwise, and anchored with interrupted buried subcutaneous sutures. Excision Method: Slit Epidermal Closure Graft Donor Site (Optional): simple interrupted Complex Repair And O-L Flap Text: The defect edges were debeveled with a #15 scalpel blade.  The primary defect was closed partially with a complex linear closure.  Given the location of the remaining defect, shape of the defect and the proximity to free margins an O-L flap was deemed most appropriate for complete closure of the defect.  Using a sterile surgical marker, an appropriate flap was drawn incorporating the defect and placing the expected incisions within the relaxed skin tension lines where possible.    The area thus outlined was incised deep to adipose tissue with a #15 scalpel blade.  The skin margins were undermined to an appropriate distance in all directions utilizing iris scissors. Cheek-To-Nose Interpolation Flap Text: A decision was made to reconstruct the defect utilizing an interpolation axial flap and a staged reconstruction.  A telfa template was made of the defect.  This telfa template was then used to outline the Cheek-To-Nose Interpolation flap.  The donor area for the pedicle flap was then injected with anesthesia.  The flap was excised through the skin and subcutaneous tissue down to the layer of the underlying musculature.  The interpolation flap was carefully excised within this deep plane to maintain its blood supply.  The edges of the donor site were undermined.   The donor site was closed in a primary fashion.  The pedicle was then rotated into position and sutured.  Once the tube was sutured into place, adequate blood supply was confirmed with blanching and refill.  The pedicle was then wrapped with xeroform gauze and dressed appropriately with a telfa and gauze bandage to ensure continued blood supply and protect the attached pedicle. Melolabial Transposition Flap Text: The defect edges were debeveled with a #15 scalpel blade.  Given the location of the defect and the proximity to free margins a melolabial flap was deemed most appropriate.  Using a sterile surgical marker, an appropriate melolabial transposition flap was drawn incorporating the defect.    The area thus outlined was incised deep to adipose tissue with a #15 scalpel blade.  The skin margins were undermined to an appropriate distance in all directions utilizing iris scissors. Detail Level: Detailed Suturegard Intro: Intraoperative tissue expansion was performed, utilizing the SUTUREGARD device, in order to reduce wound tension. Posterior Auricular Interpolation Flap Text: A decision was made to reconstruct the defect utilizing an interpolation axial flap and a staged reconstruction.  A telfa template was made of the defect.  This telfa template was then used to outline the posterior auricular interpolation flap.  The donor area for the pedicle flap was then injected with anesthesia.  The flap was excised through the skin and subcutaneous tissue down to the layer of the underlying musculature.  The pedicle flap was carefully excised within this deep plane to maintain its blood supply.  The edges of the donor site were undermined.   The donor site was closed in a primary fashion.  The pedicle was then rotated into position and sutured.  Once the tube was sutured into place, adequate blood supply was confirmed with blanching and refill.  The pedicle was then wrapped with xeroform gauze and dressed appropriately with a telfa and gauze bandage to ensure continued blood supply and protect the attached pedicle. Split-Thickness Skin Graft Text: The defect edges were debeveled with a #15 scalpel blade.  Given the location of the defect, shape of the defect and the proximity to free margins a split thickness skin graft was deemed most appropriate.  Using a sterile surgical marker, the primary defect shape was transferred to the donor site. The split thickness graft was then harvested.  The skin graft was then placed in the primary defect and oriented appropriately. Estimated Blood Loss (Cc): minimal Island Pedicle Flap Text: The defect edges were debeveled with a #15 scalpel blade.  Given the location of the defect, shape of the defect and the proximity to free margins an island pedicle advancement flap was deemed most appropriate.  Using a sterile surgical marker, an appropriate advancement flap was drawn incorporating the defect, outlining the appropriate donor tissue and placing the expected incisions within the relaxed skin tension lines where possible.    The area thus outlined was incised deep to adipose tissue with a #15 scalpel blade.  The skin margins were undermined to an appropriate distance in all directions around the primary defect and laterally outward around the island pedicle utilizing iris scissors.  There was minimal undermining beneath the pedicle flap. V-Y Flap Text: The defect edges were debeveled with a #15 scalpel blade.  Given the location of the defect, shape of the defect and the proximity to free margins a V-Y flap was deemed most appropriate.  Using a sterile surgical marker, an appropriate advancement flap was drawn incorporating the defect and placing the expected incisions within the relaxed skin tension lines where possible.    The area thus outlined was incised deep to adipose tissue with a #15 scalpel blade.  The skin margins were undermined to an appropriate distance in all directions utilizing iris scissors. Spiral Flap Text: The defect edges were debeveled with a #15 scalpel blade.  Given the location of the defect, shape of the defect and the proximity to free margins a spiral flap was deemed most appropriate.  Using a sterile surgical marker, an appropriate rotation flap was drawn incorporating the defect and placing the expected incisions within the relaxed skin tension lines where possible. The area thus outlined was incised deep to adipose tissue with a #15 scalpel blade.  The skin margins were undermined to an appropriate distance in all directions utilizing iris scissors. O-T Plasty Text: The defect edges were debeveled with a #15 scalpel blade.  Given the location of the defect, shape of the defect and the proximity to free margins an O-T plasty was deemed most appropriate.  Using a sterile surgical marker, an appropriate O-T plasty was drawn incorporating the defect and placing the expected incisions within the relaxed skin tension lines where possible.    The area thus outlined was incised deep to adipose tissue with a #15 scalpel blade.  The skin margins were undermined to an appropriate distance in all directions utilizing iris scissors. Complex Repair And Double M Plasty Text: The defect edges were debeveled with a #15 scalpel blade.  The primary defect was closed partially with a complex linear closure.  Given the location of the remaining defect, shape of the defect and the proximity to free margins a double M plasty was deemed most appropriate for complete closure of the defect.  Using a sterile surgical marker, an appropriate advancement flap was drawn incorporating the defect and placing the expected incisions within the relaxed skin tension lines where possible.    The area thus outlined was incised deep to adipose tissue with a #15 scalpel blade.  The skin margins were undermined to an appropriate distance in all directions utilizing iris scissors. Length To Time In Minutes Device Was In Place: 10 Saucerization Excision Additional Text (Leave Blank If You Do Not Want): The margin was drawn around the clinically apparent lesion.  Incisions were then made along these lines, in a tangential fashion, to the appropriate tissue plane and the lesion was extirpated. Complex Repair And Tissue Cultured Epidermal Autograft Text: The defect edges were debeveled with a #15 scalpel blade.  The primary defect was closed partially with a complex linear closure.  Given the location of the defect, shape of the defect and the proximity to free margins an tissue cultured epidermal autograft was deemed most appropriate to repair the remaining defect.  The graft was trimmed to fit the size of the remaining defect.  The graft was then placed in the primary defect, oriented appropriately, and sutured into place. Medical Necessity Information: It is in your best interest to select a reason for this procedure from the list below. All of these items fulfill various CMS LCD requirements except lesion extends to a margin. Wound Care: Petrolatum Bilateral Helical Rim Advancement Flap Text: The defect edges were debeveled with a #15 blade scalpel.  Given the location of the defect and the proximity to free margins (helical rim) a bilateral helical rim advancement flap was deemed most appropriate.  Using a sterile surgical marker, the appropriate advancement flaps were drawn incorporating the defect and placing the expected incisions between the helical rim and antihelix where possible.  The area thus outlined was incised through and through with a #15 scalpel blade.  With a skin hook and iris scissors, the flaps were gently and sharply undermined and freed up. Xenograft Text: The defect edges were debeveled with a #15 scalpel blade.  Given the location of the defect, shape of the defect and the proximity to free margins a xenograft was deemed most appropriate.  The graft was then trimmed to fit the size of the defect.  The graft was then placed in the primary defect and oriented appropriately. Post-Care Instructions: I reviewed with the patient in detail post-care instructions. Patient is not to engage in any heavy lifting, exercise, or swimming for the next 14 days. Should the patient develop any fevers, chills, bleeding, severe pain patient will contact the office immediately. Information: Selecting Yes will display possible errors in your note based on the variables you have selected. This validation is only offered as a suggestion for you. PLEASE NOTE THAT THE VALIDATION TEXT WILL BE REMOVED WHEN YOU FINALIZE YOUR NOTE. IF YOU WANT TO FAX A PRELIMINARY NOTE YOU WILL NEED TO TOGGLE THIS TO 'NO' IF YOU DO NOT WANT IT IN YOUR FAXED NOTE. Cheek Interpolation Flap Text: A decision was made to reconstruct the defect utilizing an interpolation axial flap and a staged reconstruction.  A telfa template was made of the defect.  This telfa template was then used to outline the Cheek Interpolation flap.  The donor area for the pedicle flap was then injected with anesthesia.  The flap was excised through the skin and subcutaneous tissue down to the layer of the underlying musculature.  The interpolation flap was carefully excised within this deep plane to maintain its blood supply.  The edges of the donor site were undermined.   The donor site was closed in a primary fashion.  The pedicle was then rotated into position and sutured.  Once the tube was sutured into place, adequate blood supply was confirmed with blanching and refill.  The pedicle was then wrapped with xeroform gauze and dressed appropriately with a telfa and gauze bandage to ensure continued blood supply and protect the attached pedicle. Complex Repair And Rotation Flap Text: The defect edges were debeveled with a #15 scalpel blade.  The primary defect was closed partially with a complex linear closure.  Given the location of the remaining defect, shape of the defect and the proximity to free margins a rotation flap was deemed most appropriate for complete closure of the defect.  Using a sterile surgical marker, an appropriate advancement flap was drawn incorporating the defect and placing the expected incisions within the relaxed skin tension lines where possible.    The area thus outlined was incised deep to adipose tissue with a #15 scalpel blade.  The skin margins were undermined to an appropriate distance in all directions utilizing iris scissors. Medical Necessity Clause: This procedure was medically necessary because the lesion that was treated was: No Repair - Repaired With Adjacent Surgical Defect Text (Leave Blank If You Do Not Want): After the excision the defect was repaired concurrently with another surgical defect which was in close approximation. Double O-Z Flap Text: The defect edges were debeveled with a #15 scalpel blade.  Given the location of the defect, shape of the defect and the proximity to free margins a Double O-Z flap was deemed most appropriate.  Using a sterile surgical marker, an appropriate transposition flap was drawn incorporating the defect and placing the expected incisions within the relaxed skin tension lines where possible. The area thus outlined was incised deep to adipose tissue with a #15 scalpel blade.  The skin margins were undermined to an appropriate distance in all directions utilizing iris scissors. Ftsg Text: The defect edges were debeveled with a #15 scalpel blade.  Given the location of the defect, shape of the defect and the proximity to free margins a full thickness skin graft was deemed most appropriate.  Using a sterile surgical marker, the primary defect shape was transferred to the donor site. The area thus outlined was incised deep to adipose tissue with a #15 scalpel blade.  The harvested graft was then trimmed of adipose tissue until only dermis and epidermis was left.  The skin margins of the secondary defect were undermined to an appropriate distance in all directions utilizing iris scissors.  The secondary defect was closed with interrupted buried subcutaneous sutures.  The skin edges were then re-apposed with running  sutures.  The skin graft was then placed in the primary defect and oriented appropriately. Debridement Text: The wound edges were debrided prior to proceeding with the closure to facilitate wound healing. Composite Graft Text: The defect edges were debeveled with a #15 scalpel blade.  Given the location of the defect, shape of the defect, the proximity to free margins and the fact the defect was full thickness a composite graft was deemed most appropriate.  The defect was outline and then transferred to the donor site.  A full thickness graft was then excised from the donor site. The graft was then placed in the primary defect, oriented appropriately and then sutured into place.  The secondary defect was then repaired using a primary closure. Rotation Flap Text: The defect edges were debeveled with a #15 scalpel blade.  Given the location of the defect, shape of the defect and the proximity to free margins a rotation flap was deemed most appropriate.  Using a sterile surgical marker, an appropriate rotation flap was drawn incorporating the defect and placing the expected incisions within the relaxed skin tension lines where possible.    The area thus outlined was incised deep to adipose tissue with a #15 scalpel blade.  The skin margins were undermined to an appropriate distance in all directions utilizing iris scissors. Eliptical Excision Additional Text (Leave Blank If You Do Not Want): The margin was drawn around the clinically apparent lesion.  An elliptical shape was then drawn on the skin incorporating the lesion and margins.  Incisions were then made along these lines to the appropriate tissue plane and the lesion was extirpated. Complex Repair And Dermal Autograft Text: The defect edges were debeveled with a #15 scalpel blade.  The primary defect was closed partially with a complex linear closure.  Given the location of the defect, shape of the defect and the proximity to free margins an dermal autograft was deemed most appropriate to repair the remaining defect.  The graft was trimmed to fit the size of the remaining defect.  The graft was then placed in the primary defect, oriented appropriately, and sutured into place. Helical Rim Advancement Flap Text: The defect edges were debeveled with a #15 blade scalpel.  Given the location of the defect and the proximity to free margins (helical rim) a double helical rim advancement flap was deemed most appropriate.  Using a sterile surgical marker, the appropriate advancement flaps were drawn incorporating the defect and placing the expected incisions between the helical rim and antihelix where possible.  The area thus outlined was incised through and through with a #15 scalpel blade.  With a skin hook and iris scissors, the flaps were gently and sharply undermined and freed up. Burow's Advancement Flap Text: The defect edges were debeveled with a #15 scalpel blade.  Given the location of the defect and the proximity to free margins a Burow's advancement flap was deemed most appropriate.  Using a sterile surgical marker, the appropriate advancement flap was drawn incorporating the defect and placing the expected incisions within the relaxed skin tension lines where possible.    The area thus outlined was incised deep to adipose tissue with a #15 scalpel blade.  The skin margins were undermined to an appropriate distance in all directions utilizing iris scissors. Tissue Cultured Epidermal Autograft Text: The defect edges were debeveled with a #15 scalpel blade.  Given the location of the defect, shape of the defect and the proximity to free margins a tissue cultured epidermal autograft was deemed most appropriate.  The graft was then trimmed to fit the size of the defect.  The graft was then placed in the primary defect and oriented appropriately. Island Pedicle Flap-Requiring Vessel Identification Text: The defect edges were debeveled with a #15 scalpel blade.  Given the location of the defect, shape of the defect and the proximity to free margins an island pedicle advancement flap was deemed most appropriate.  Using a sterile surgical marker, an appropriate advancement flap was drawn, based on the axial vessel mentioned above, incorporating the defect, outlining the appropriate donor tissue and placing the expected incisions within the relaxed skin tension lines where possible.    The area thus outlined was incised deep to adipose tissue with a #15 scalpel blade.  The skin margins were undermined to an appropriate distance in all directions around the primary defect and laterally outward around the island pedicle utilizing iris scissors.  There was minimal undermining beneath the pedicle flap. Cartilage Graft Text: The defect edges were debeveled with a #15 scalpel blade.  Given the location of the defect, shape of the defect, the fact the defect involved a full thickness cartilage defect a cartilage graft was deemed most appropriate.  An appropriate donor site was identified, cleansed, and anesthetized. The cartilage graft was then harvested and transferred to the recipient site, oriented appropriately and then sutured into place.  The secondary defect was then repaired using a primary closure. Size Of Margin In Cm: 0.2 Nasal Turnover Hinge Flap Text: The defect edges were debeveled with a #15 scalpel blade.  Given the size, depth, location of the defect and the defect being full thickness a nasal turnover hinge flap was deemed most appropriate.  Using a sterile surgical marker, an appropriate hinge flap was drawn incorporating the defect. The area thus outlined was incised with a #15 scalpel blade. The flap was designed to recreate the nasal mucosal lining and the alar rim. The skin margins were undermined to an appropriate distance in all directions utilizing iris scissors. Complex Repair And Melolabial Flap Text: The defect edges were debeveled with a #15 scalpel blade.  The primary defect was closed partially with a complex linear closure.  Given the location of the remaining defect, shape of the defect and the proximity to free margins a melolabial flap was deemed most appropriate for complete closure of the defect.  Using a sterile surgical marker, an appropriate advancement flap was drawn incorporating the defect and placing the expected incisions within the relaxed skin tension lines where possible.    The area thus outlined was incised deep to adipose tissue with a #15 scalpel blade.  The skin margins were undermined to an appropriate distance in all directions utilizing iris scissors. Zygomaticofacial Flap Text: Given the location of the defect, shape of the defect and the proximity to free margins a zygomaticofacial flap was deemed most appropriate for repair.  Using a sterile surgical marker, the appropriate flap was drawn incorporating the defect and placing the expected incisions within the relaxed skin tension lines where possible. The area thus outlined was incised deep to adipose tissue with a #15 scalpel blade with preservation of a vascular pedicle.  The skin margins were undermined to an appropriate distance in all directions utilizing iris scissors.  The flap was then placed into the defect and anchored with interrupted buried subcutaneous sutures. Complex Repair And V-Y Plasty Text: The defect edges were debeveled with a #15 scalpel blade.  The primary defect was closed partially with a complex linear closure.  Given the location of the remaining defect, shape of the defect and the proximity to free margins a V-Y plasty was deemed most appropriate for complete closure of the defect.  Using a sterile surgical marker, an appropriate advancement flap was drawn incorporating the defect and placing the expected incisions within the relaxed skin tension lines where possible.    The area thus outlined was incised deep to adipose tissue with a #15 scalpel blade.  The skin margins were undermined to an appropriate distance in all directions utilizing iris scissors. Bilobed Transposition Flap Text: The defect edges were debeveled with a #15 scalpel blade.  Given the location of the defect and the proximity to free margins a bilobed transposition flap was deemed most appropriate.  Using a sterile surgical marker, an appropriate bilobe flap drawn around the defect.    The area thus outlined was incised deep to adipose tissue with a #15 scalpel blade.  The skin margins were undermined to an appropriate distance in all directions utilizing iris scissors. Complex Repair And Split-Thickness Skin Graft Text: The defect edges were debeveled with a #15 scalpel blade.  The primary defect was closed partially with a complex linear closure.  Given the location of the defect, shape of the defect and the proximity to free margins a split thickness skin graft was deemed most appropriate to repair the remaining defect.  The graft was trimmed to fit the size of the remaining defect.  The graft was then placed in the primary defect, oriented appropriately, and sutured into place. O-L Flap Text: The defect edges were debeveled with a #15 scalpel blade.  Given the location of the defect, shape of the defect and the proximity to free margins an O-L flap was deemed most appropriate.  Using a sterile surgical marker, an appropriate advancement flap was drawn incorporating the defect and placing the expected incisions within the relaxed skin tension lines where possible.    The area thus outlined was incised deep to adipose tissue with a #15 scalpel blade.  The skin margins were undermined to an appropriate distance in all directions utilizing iris scissors. Skin Substitute Text: The defect edges were debeveled with a #15 scalpel blade.  Given the location of the defect, shape of the defect and the proximity to free margins a skin substitute graft was deemed most appropriate.  The graft material was trimmed to fit the size of the defect. The graft was then placed in the primary defect and oriented appropriately. Mucosal Advancement Flap Text: Given the location of the defect, shape of the defect and the proximity to free margins a mucosal advancement flap was deemed most appropriate. Incisions were made with a 15 blade scalpel in the appropriate fashion along the cutaneous vermillion border and the mucosal lip. The remaining actinically damaged mucosal tissue was excised.  The mucosal advancement flap was then elevated to the gingival sulcus with care taken to preserve the neurovascular structures and advanced into the primary defect. Care was taken to ensure that precise realignment of the vermilion border was achieved. Double O-Z Plasty Text: The defect edges were debeveled with a #15 scalpel blade.  Given the location of the defect, shape of the defect and the proximity to free margins a Double O-Z plasty (double transposition flap) was deemed most appropriate.  Using a sterile surgical marker, the appropriate transposition flaps were drawn incorporating the defect and placing the expected incisions within the relaxed skin tension lines where possible. The area thus outlined was incised deep to adipose tissue with a #15 scalpel blade.  The skin margins were undermined to an appropriate distance in all directions utilizing iris scissors.  Hemostasis was achieved with electrocautery.  The flaps were then transposed into place, one clockwise and the other counterclockwise, and anchored with interrupted buried subcutaneous sutures. Complex Repair And Modified Advancement Flap Text: The defect edges were debeveled with a #15 scalpel blade.  The primary defect was closed partially with a complex linear closure.  Given the location of the remaining defect, shape of the defect and the proximity to free margins a modified advancement flap was deemed most appropriate for complete closure of the defect.  Using a sterile surgical marker, an appropriate advancement flap was drawn incorporating the defect and placing the expected incisions within the relaxed skin tension lines where possible.    The area thus outlined was incised deep to adipose tissue with a #15 scalpel blade.  The skin margins were undermined to an appropriate distance in all directions utilizing iris scissors. Rhomboid Transposition Flap Text: The defect edges were debeveled with a #15 scalpel blade.  Given the location of the defect and the proximity to free margins a rhomboid transposition flap was deemed most appropriate.  Using a sterile surgical marker, an appropriate rhomboid flap was drawn incorporating the defect.    The area thus outlined was incised deep to adipose tissue with a #15 scalpel blade.  The skin margins were undermined to an appropriate distance in all directions utilizing iris scissors. Scalpel Size: 15 blade Undermining Type: Entire Wound Complex Repair And Bilobe Flap Text: The defect edges were debeveled with a #15 scalpel blade.  The primary defect was closed partially with a complex linear closure.  Given the location of the remaining defect, shape of the defect and the proximity to free margins a bilobe flap was deemed most appropriate for complete closure of the defect.  Using a sterile surgical marker, an appropriate advancement flap was drawn incorporating the defect and placing the expected incisions within the relaxed skin tension lines where possible.    The area thus outlined was incised deep to adipose tissue with a #15 scalpel blade.  The skin margins were undermined to an appropriate distance in all directions utilizing iris scissors. Suture Removal: 14 days Z Plasty Text: The lesion was extirpated to the level of the fat with a #15 scalpel blade.  Given the location of the defect, shape of the defect and the proximity to free margins a Z-plasty was deemed most appropriate for repair.  Using a sterile surgical marker, the appropriate transposition arms of the Z-plasty were drawn incorporating the defect and placing the expected incisions within the relaxed skin tension lines where possible.    The area thus outlined was incised deep to adipose tissue with a #15 scalpel blade.  The skin margins were undermined to an appropriate distance in all directions utilizing iris scissors.  The opposing transposition arms were then transposed into place in opposite direction and anchored with interrupted buried subcutaneous sutures. Perilesional Excision Additional Text (Leave Blank If You Do Not Want): The margin was drawn around the clinically apparent lesion. Incisions were then made along these lines to the appropriate tissue plane and the lesion was extirpated. Rhombic Flap Text: The defect edges were debeveled with a #15 scalpel blade.  Given the location of the defect and the proximity to free margins a rhombic flap was deemed most appropriate.  Using a sterile surgical marker, an appropriate rhombic flap was drawn incorporating the defect.    The area thus outlined was incised deep to adipose tissue with a #15 scalpel blade.  The skin margins were undermined to an appropriate distance in all directions utilizing iris scissors. Dressing: dry sterile dressing Advancement Flap (Single) Text: The defect edges were debeveled with a #15 scalpel blade.  Given the location of the defect and the proximity to free margins a single advancement flap was deemed most appropriate.  Using a sterile surgical marker, an appropriate advancement flap was drawn incorporating the defect and placing the expected incisions within the relaxed skin tension lines where possible.    The area thus outlined was incised deep to adipose tissue with a #15 scalpel blade.  The skin margins were undermined to an appropriate distance in all directions utilizing iris scissors. Burow's Graft Text: The defect edges were debeveled with a #15 scalpel blade.  Given the location of the defect, shape of the defect, the proximity to free margins and the presence of a standing cone deformity a Burow's skin graft was deemed most appropriate. The standing cone was removed and this tissue was then trimmed to the shape of the primary defect. The adipose tissue was also removed until only dermis and epidermis were left.  The skin margins of the secondary defect were undermined to an appropriate distance in all directions utilizing iris scissors.  The secondary defect was closed with interrupted buried subcutaneous sutures.  The skin edges were then re-apposed with running  sutures.  The skin graft was then placed in the primary defect and oriented appropriately. O-T Advancement Flap Text: The defect edges were debeveled with a #15 scalpel blade.  Given the location of the defect, shape of the defect and the proximity to free margins an O-T advancement flap was deemed most appropriate.  Using a sterile surgical marker, an appropriate advancement flap was drawn incorporating the defect and placing the expected incisions within the relaxed skin tension lines where possible.    The area thus outlined was incised deep to adipose tissue with a #15 scalpel blade.  The skin margins were undermined to an appropriate distance in all directions utilizing iris scissors. Suturegard Body: The suture ends were repeatedly re-tightened and re-clamped to achieve the desired tissue expansion. Epidermal Sutures: 4-0 Ethilon W Plasty Text: The lesion was extirpated to the level of the fat with a #15 scalpel blade.  Given the location of the defect, shape of the defect and the proximity to free margins a W-plasty was deemed most appropriate for repair.  Using a sterile surgical marker, the appropriate transposition arms of the W-plasty were drawn incorporating the defect and placing the expected incisions within the relaxed skin tension lines where possible.    The area thus outlined was incised deep to adipose tissue with a #15 scalpel blade.  The skin margins were undermined to an appropriate distance in all directions utilizing iris scissors.  The opposing transposition arms were then transposed into place in opposite direction and anchored with interrupted buried subcutaneous sutures. Deep Sutures: 5-0 Vicryl Complex Repair And Burow's Graft Text: The defect edges were debeveled with a #15 scalpel blade.  The primary defect was closed partially with a complex linear closure.  Given the location of the defect, shape of the defect, the proximity to free margins and the presence of a standing cone deformity a Burow's graft was deemed most appropriate to repair the remaining defect.  The graft was trimmed to fit the size of the remaining defect.  The graft was then placed in the primary defect, oriented appropriately, and sutured into place. Island Pedicle Flap With Canthal Suspension Text: The defect edges were debeveled with a #15 scalpel blade.  Given the location of the defect, shape of the defect and the proximity to free margins an island pedicle advancement flap was deemed most appropriate.  Using a sterile surgical marker, an appropriate advancement flap was drawn incorporating the defect, outlining the appropriate donor tissue and placing the expected incisions within the relaxed skin tension lines where possible. The area thus outlined was incised deep to adipose tissue with a #15 scalpel blade.  The skin margins were undermined to an appropriate distance in all directions around the primary defect and laterally outward around the island pedicle utilizing iris scissors.  There was minimal undermining beneath the pedicle flap. A suspension suture was placed in the canthal tendon to prevent tension and prevent ectropion. Ear Star Wedge Flap Text: The defect edges were debeveled with a #15 blade scalpel.  Given the location of the defect and the proximity to free margins (helical rim) an ear star wedge flap was deemed most appropriate.  Using a sterile surgical marker, the appropriate flap was drawn incorporating the defect and placing the expected incisions between the helical rim and antihelix where possible.  The area thus outlined was incised through and through with a #15 scalpel blade. Where Do You Want The Question To Include Opioid Counseling Located?: Case Summary Tab Complex Repair And Xenograft Text: The defect edges were debeveled with a #15 scalpel blade.  The primary defect was closed partially with a complex linear closure.  Given the location of the defect, shape of the defect and the proximity to free margins a xenograft was deemed most appropriate to repair the remaining defect.  The graft was trimmed to fit the size of the remaining defect.  The graft was then placed in the primary defect, oriented appropriately, and sutured into place. Mercedes Flap Text: The defect edges were debeveled with a #15 scalpel blade.  Given the location of the defect, shape of the defect and the proximity to free margins a Mercedes flap was deemed most appropriate.  Using a sterile surgical marker, an appropriate advancement flap was drawn incorporating the defect and placing the expected incisions within the relaxed skin tension lines where possible. The area thus outlined was incised deep to adipose tissue with a #15 scalpel blade.  The skin margins were undermined to an appropriate distance in all directions utilizing iris scissors. Star Wedge Flap Text: The defect edges were debeveled with a #15 scalpel blade.  Given the location of the defect, shape of the defect and the proximity to free margins a star wedge flap was deemed most appropriate.  Using a sterile surgical marker, an appropriate rotation flap was drawn incorporating the defect and placing the expected incisions within the relaxed skin tension lines where possible. The area thus outlined was incised deep to adipose tissue with a #15 scalpel blade.  The skin margins were undermined to an appropriate distance in all directions utilizing iris scissors. Size Of Lesion In Cm: 1.5 Complex Repair And Rhombic Flap Text: The defect edges were debeveled with a #15 scalpel blade.  The primary defect was closed partially with a complex linear closure.  Given the location of the remaining defect, shape of the defect and the proximity to free margins a rhombic flap was deemed most appropriate for complete closure of the defect.  Using a sterile surgical marker, an appropriate advancement flap was drawn incorporating the defect and placing the expected incisions within the relaxed skin tension lines where possible.    The area thus outlined was incised deep to adipose tissue with a #15 scalpel blade.  The skin margins were undermined to an appropriate distance in all directions utilizing iris scissors. Number Of Hemigard Strips Per Side: 1 Slit Excision Additional Text (Leave Blank If You Do Not Want): A linear line was drawn on the skin overlying the lesion. An incision was made slowly until the lesion was visualized.  Once visualized, the lesion was removed with blunt dissection. Complex Repair And Ftsg Text: The defect edges were debeveled with a #15 scalpel blade.  The primary defect was closed partially with a complex linear closure.  Given the location of the defect, shape of the defect and the proximity to free margins a full thickness skin graft was deemed most appropriate to repair the remaining defect.  The graft was trimmed to fit the size of the remaining defect.  The graft was then placed in the primary defect, oriented appropriately, and sutured into place. Orbicularis Oris Muscle Flap Text: The defect edges were debeveled with a #15 scalpel blade.  Given that the defect affected the competency of the oral sphincter an obicularis oris muscle flap was deemed most appropriate to restore this competency and normal muscle function.  Using a sterile surgical marker, an appropriate flap was drawn incorporating the defect. The area thus outlined was incised with a #15 scalpel blade. Crescentic Advancement Flap Text: The defect edges were debeveled with a #15 scalpel blade.  Given the location of the defect and the proximity to free margins a crescentic advancement flap was deemed most appropriate.  Using a sterile surgical marker, the appropriate advancement flap was drawn incorporating the defect and placing the expected incisions within the relaxed skin tension lines where possible.    The area thus outlined was incised deep to adipose tissue with a #15 scalpel blade.  The skin margins were undermined to an appropriate distance in all directions utilizing iris scissors. Epidermal Autograft Text: The defect edges were debeveled with a #15 scalpel blade.  Given the location of the defect, shape of the defect and the proximity to free margins an epidermal autograft was deemed most appropriate.  Using a sterile surgical marker, the primary defect shape was transferred to the donor site. The epidermal graft was then harvested.  The skin graft was then placed in the primary defect and oriented appropriately. Hemostasis: Electrocautery Dorsal Nasal Flap Text: The defect edges were debeveled with a #15 scalpel blade.  Given the location of the defect and the proximity to free margins a dorsal nasal flap was deemed most appropriate.  Using a sterile surgical marker, an appropriate dorsal nasal flap was drawn around the defect.    The area thus outlined was incised deep to adipose tissue with a #15 scalpel blade.  The skin margins were undermined to an appropriate distance in all directions utilizing iris scissors. Billing Type: Third-Party Bill Keystone Flap Text: The defect edges were debeveled with a #15 scalpel blade.  Given the location of the defect, shape of the defect a keystone flap was deemed most appropriate.  Using a sterile surgical marker, an appropriate keystone flap was drawn incorporating the defect, outlining the appropriate donor tissue and placing the expected incisions within the relaxed skin tension lines where possible. The area thus outlined was incised deep to adipose tissue with a #15 scalpel blade.  The skin margins were undermined to an appropriate distance in all directions around the primary defect and laterally outward around the flap utilizing iris scissors.

## 2025-01-26 NOTE — CONSULT NOTE ADULT - ASSESSMENT
74y Female w/ R knee and leg pain s/p recent R TKA     PLAN  -WBAT RLE   -f/u labs   -f/u XRs  -f/u RLE doppler   -ice/cold compress, elevation      Adelina Mishra MD, PGY-2  Orthopaedic Surgery  [facility] [id number]  [facility]        [id number]  [facility]  [id number]/ [phone] 74y Female w/ R knee and leg pain s/p recent R TKA     PLAN  -WBAT RLE   -Continue working with PT  -Multimodal pain control as needed   -XR negative for fracture, RLE doppler negative for DVT  -ice/cold compress, elevation  -Follow up with Dr. Garcia, patient has appointment scheduled       Adelina Mishra MD, PGY-2  Orthopaedic Surgery  [facility] [place] [id number]  [facility]        [id number]  [facility]  [id number]/ [phone]

## 2025-01-26 NOTE — ED PROVIDER NOTE - CLINICAL SUMMARY MEDICAL DECISION MAKING FREE TEXT BOX
73yo F hx of HTN, HLD, DM recently had right knee replacement surgery on 1/20, presenting with severe right knee pain and right lower extremity swelling. Pt notes pain has been present since knee replacement and has increased throughout the week as swelling increased. Pain is worst in the medial aspect of the knee and with warmth and redness. Exam with 2+ edema from right foot to mid thigh, more pronounced swelling in particularly medial aspect of knee and posterior thigh, erythema and warmth in medial aspect and tenderness to palpation of right medial ad posterior knee and thigh. DDx includes RLE DVT vs infection vs complication post procedure. Will obtain screening labs, CRP/ESR, RLE duplex, right knee/tibia/fibula xrays. Will give morphine 2mg for pain. Will reassess.

## 2025-01-26 NOTE — ED PROVIDER NOTE - NSFOLLOWUPINSTRUCTIONS_ED_ALL_ED_FT
You came in with pain and swelling in your right knee and leg. You were evaluated with an ultrasound and xrays which all came back normal. Orthopedic surgery team evaluated you and recommended no further hospital treatment. You are to follow up outpatient with orthopedics. If your pain becomes unbearable, return to the ED,

## 2025-01-30 ENCOUNTER — NON-APPOINTMENT (OUTPATIENT)
Age: 75
End: 2025-01-30

## 2025-02-06 ENCOUNTER — NON-APPOINTMENT (OUTPATIENT)
Age: 75
End: 2025-02-06

## 2025-02-06 ENCOUNTER — APPOINTMENT (OUTPATIENT)
Dept: ORTHOPEDIC SURGERY | Facility: CLINIC | Age: 75
End: 2025-02-06
Payer: MEDICARE

## 2025-02-06 VITALS — BODY MASS INDEX: 38.09 KG/M2 | WEIGHT: 215 LBS | HEIGHT: 63 IN

## 2025-02-06 DIAGNOSIS — Z96.651 PRESENCE OF RIGHT ARTIFICIAL KNEE JOINT: ICD-10-CM

## 2025-02-06 PROCEDURE — 99024 POSTOP FOLLOW-UP VISIT: CPT

## 2025-02-06 PROCEDURE — 73562 X-RAY EXAM OF KNEE 3: CPT | Mod: RT

## 2025-02-07 ENCOUNTER — LABORATORY RESULT (OUTPATIENT)
Age: 75
End: 2025-02-07

## 2025-02-07 ENCOUNTER — APPOINTMENT (OUTPATIENT)
Dept: FAMILY MEDICINE | Facility: CLINIC | Age: 75
End: 2025-02-07
Payer: MEDICARE

## 2025-02-07 VITALS
OXYGEN SATURATION: 96 % | SYSTOLIC BLOOD PRESSURE: 156 MMHG | BODY MASS INDEX: 36.68 KG/M2 | DIASTOLIC BLOOD PRESSURE: 86 MMHG | RESPIRATION RATE: 16 BRPM | WEIGHT: 207 LBS | HEIGHT: 63 IN | HEART RATE: 72 BPM | TEMPERATURE: 97.6 F

## 2025-02-07 DIAGNOSIS — N39.0 URINARY TRACT INFECTION, SITE NOT SPECIFIED: ICD-10-CM

## 2025-02-07 DIAGNOSIS — B96.29 URINARY TRACT INFECTION, SITE NOT SPECIFIED: ICD-10-CM

## 2025-02-07 DIAGNOSIS — Z16.12 URINARY TRACT INFECTION, SITE NOT SPECIFIED: ICD-10-CM

## 2025-02-07 DIAGNOSIS — M25.551 PAIN IN RIGHT HIP: ICD-10-CM

## 2025-02-07 PROCEDURE — 99213 OFFICE O/P EST LOW 20 MIN: CPT

## 2025-02-07 PROCEDURE — G2211 COMPLEX E/M VISIT ADD ON: CPT

## 2025-02-07 RX ORDER — METOPROLOL SUCCINATE 25 MG/1
25 TABLET, EXTENDED RELEASE ORAL DAILY
Refills: 0 | Status: ACTIVE | COMMUNITY

## 2025-02-07 RX ORDER — DICLOFENAC SODIUM 10 MG/G
1 GEL TOPICAL
Qty: 100 | Refills: 4 | Status: ACTIVE | COMMUNITY
Start: 2025-02-07 | End: 1900-01-01

## 2025-02-07 RX ORDER — ACETAMINOPHEN 325 MG/1
325 TABLET ORAL
Refills: 0 | Status: ACTIVE | COMMUNITY

## 2025-02-07 RX ORDER — TIRZEPATIDE 15 MG/.5ML
15 INJECTION, SOLUTION SUBCUTANEOUS
Refills: 0 | Status: ACTIVE | COMMUNITY

## 2025-02-11 ENCOUNTER — APPOINTMENT (OUTPATIENT)
Dept: CT IMAGING | Facility: IMAGING CENTER | Age: 75
End: 2025-02-11
Payer: MEDICARE

## 2025-02-11 ENCOUNTER — OUTPATIENT (OUTPATIENT)
Dept: OUTPATIENT SERVICES | Facility: HOSPITAL | Age: 75
LOS: 1 days | End: 2025-02-11
Payer: MEDICARE

## 2025-02-11 DIAGNOSIS — Z98.1 ARTHRODESIS STATUS: Chronic | ICD-10-CM

## 2025-02-11 DIAGNOSIS — Z90.3 ACQUIRED ABSENCE OF STOMACH [PART OF]: Chronic | ICD-10-CM

## 2025-02-11 DIAGNOSIS — H26.9 UNSPECIFIED CATARACT: Chronic | ICD-10-CM

## 2025-02-11 DIAGNOSIS — M25.559 PAIN IN UNSPECIFIED HIP: ICD-10-CM

## 2025-02-11 LAB
APPEARANCE: ABNORMAL
BILIRUBIN URINE: NEGATIVE
BLOOD URINE: ABNORMAL
COLOR: YELLOW
GLUCOSE QUALITATIVE U: NEGATIVE MG/DL
KETONES URINE: NEGATIVE MG/DL
LEUKOCYTE ESTERASE URINE: ABNORMAL
NITRITE URINE: POSITIVE
PH URINE: 6
PROTEIN URINE: 30 MG/DL
SPECIFIC GRAVITY URINE: 1.02
UROBILINOGEN URINE: 0.2 MG/DL

## 2025-02-11 PROCEDURE — 73700 CT LOWER EXTREMITY W/O DYE: CPT

## 2025-02-11 PROCEDURE — 73700 CT LOWER EXTREMITY W/O DYE: CPT | Mod: 26,RT

## 2025-02-11 RX ORDER — OXYCODONE AND ACETAMINOPHEN 5; 325 MG/1; MG/1
5-325 TABLET ORAL
Qty: 10 | Refills: 0 | Status: ACTIVE | COMMUNITY
Start: 2025-02-11 | End: 1900-01-01

## 2025-02-18 NOTE — ASU PATIENT PROFILE, ADULT - IS PATIENT PREGNANT?
----- Message -----   From: Aliya Peraza   Sent: 2/17/2025   4:19 PM CST   To: [facility] Front Office   Subject: Appointment Request (HM)                          Can I get bloodwork done BEFORE my appt with To, so we can discuss blood results       Labs in process  
no

## 2025-02-24 ENCOUNTER — NON-APPOINTMENT (OUTPATIENT)
Age: 75
End: 2025-02-24

## 2025-02-25 ENCOUNTER — TRANSCRIPTION ENCOUNTER (OUTPATIENT)
Age: 75
End: 2025-02-25

## 2025-03-25 ENCOUNTER — TRANSCRIPTION ENCOUNTER (OUTPATIENT)
Age: 75
End: 2025-03-25

## 2025-04-05 ENCOUNTER — NON-APPOINTMENT (OUTPATIENT)
Age: 75
End: 2025-04-05

## 2025-04-10 ENCOUNTER — APPOINTMENT (OUTPATIENT)
Dept: ORTHOPEDIC SURGERY | Facility: CLINIC | Age: 75
End: 2025-04-10
Payer: MEDICARE

## 2025-04-10 DIAGNOSIS — M60.9 MYOSITIS, UNSPECIFIED: ICD-10-CM

## 2025-04-10 DIAGNOSIS — M62.830 MUSCLE SPASM OF BACK: ICD-10-CM

## 2025-04-10 DIAGNOSIS — M25.511 PAIN IN RIGHT SHOULDER: ICD-10-CM

## 2025-04-10 DIAGNOSIS — M89.8X1 OTHER SPECIFIED DISORDERS OF BONE, SHOULDER: ICD-10-CM

## 2025-04-10 PROCEDURE — 99213 OFFICE O/P EST LOW 20 MIN: CPT | Mod: 25

## 2025-04-10 PROCEDURE — 20553 NJX 1/MLT TRIGGER POINTS 3/>: CPT | Mod: RT

## 2025-04-10 RX ORDER — DICLOFENAC SODIUM 10 MG/G
1 GEL TOPICAL DAILY
Qty: 1 | Refills: 0 | Status: ACTIVE | COMMUNITY
Start: 2025-04-10 | End: 1900-01-01

## 2025-05-07 ENCOUNTER — NON-APPOINTMENT (OUTPATIENT)
Age: 75
End: 2025-05-07

## 2025-05-07 ENCOUNTER — APPOINTMENT (OUTPATIENT)
Dept: CARDIOLOGY | Facility: CLINIC | Age: 75
End: 2025-05-07
Payer: MEDICARE

## 2025-05-07 VITALS
RESPIRATION RATE: 16 BRPM | DIASTOLIC BLOOD PRESSURE: 70 MMHG | WEIGHT: 210 LBS | OXYGEN SATURATION: 98 % | HEART RATE: 74 BPM | SYSTOLIC BLOOD PRESSURE: 118 MMHG | HEIGHT: 63 IN | TEMPERATURE: 98 F | BODY MASS INDEX: 37.21 KG/M2

## 2025-05-07 DIAGNOSIS — E78.5 HYPERLIPIDEMIA, UNSPECIFIED: ICD-10-CM

## 2025-05-07 DIAGNOSIS — I35.1 NONRHEUMATIC AORTIC (VALVE) INSUFFICIENCY: ICD-10-CM

## 2025-05-07 DIAGNOSIS — E11.9 TYPE 2 DIABETES MELLITUS W/OUT COMPLICATIONS: ICD-10-CM

## 2025-05-07 DIAGNOSIS — I10 ESSENTIAL (PRIMARY) HYPERTENSION: ICD-10-CM

## 2025-05-07 DIAGNOSIS — I25.10 ATHEROSCLEROTIC HEART DISEASE OF NATIVE CORONARY ARTERY W/OUT ANGINA PECTORIS: ICD-10-CM

## 2025-05-07 DIAGNOSIS — Z01.810 ENCOUNTER FOR PREPROCEDURAL CARDIOVASCULAR EXAMINATION: ICD-10-CM

## 2025-05-07 PROCEDURE — G2211 COMPLEX E/M VISIT ADD ON: CPT

## 2025-05-07 PROCEDURE — 99215 OFFICE O/P EST HI 40 MIN: CPT

## 2025-05-07 PROCEDURE — 93000 ELECTROCARDIOGRAM COMPLETE: CPT | Mod: NC

## 2025-05-08 ENCOUNTER — APPOINTMENT (OUTPATIENT)
Dept: ORTHOPEDIC SURGERY | Facility: CLINIC | Age: 75
End: 2025-05-08
Payer: MEDICARE

## 2025-05-08 VITALS — WEIGHT: 210 LBS | HEIGHT: 64 IN | BODY MASS INDEX: 35.85 KG/M2

## 2025-05-08 DIAGNOSIS — M17.11 UNILATERAL PRIMARY OSTEOARTHRITIS, RIGHT KNEE: ICD-10-CM

## 2025-05-08 DIAGNOSIS — M16.11 UNILATERAL PRIMARY OSTEOARTHRITIS, RIGHT HIP: ICD-10-CM

## 2025-05-08 PROCEDURE — 99214 OFFICE O/P EST MOD 30 MIN: CPT

## 2025-05-08 PROCEDURE — 73562 X-RAY EXAM OF KNEE 3: CPT | Mod: RT

## 2025-05-12 ENCOUNTER — RX RENEWAL (OUTPATIENT)
Age: 75
End: 2025-05-12

## 2025-05-21 ENCOUNTER — APPOINTMENT (OUTPATIENT)
Dept: FAMILY MEDICINE | Facility: CLINIC | Age: 75
End: 2025-05-21
Payer: MEDICARE

## 2025-05-21 ENCOUNTER — LABORATORY RESULT (OUTPATIENT)
Age: 75
End: 2025-05-21

## 2025-05-21 VITALS
SYSTOLIC BLOOD PRESSURE: 156 MMHG | OXYGEN SATURATION: 97 % | WEIGHT: 210 LBS | TEMPERATURE: 97.6 F | HEIGHT: 64 IN | DIASTOLIC BLOOD PRESSURE: 69 MMHG | HEART RATE: 59 BPM | BODY MASS INDEX: 35.85 KG/M2

## 2025-05-21 DIAGNOSIS — M79.606 PAIN IN LEG, UNSPECIFIED: ICD-10-CM

## 2025-05-21 DIAGNOSIS — R82.71 BACTERIURIA: ICD-10-CM

## 2025-05-21 PROCEDURE — G2211 COMPLEX E/M VISIT ADD ON: CPT

## 2025-05-21 PROCEDURE — 99213 OFFICE O/P EST LOW 20 MIN: CPT

## 2025-05-23 LAB
APPEARANCE: ABNORMAL
BILIRUBIN URINE: NEGATIVE
BLOOD URINE: ABNORMAL
COLOR: NORMAL
GLUCOSE QUALITATIVE U: NEGATIVE MG/DL
KETONES URINE: NEGATIVE MG/DL
LEUKOCYTE ESTERASE URINE: ABNORMAL
NITRITE URINE: POSITIVE
PH URINE: >=9
PROTEIN URINE: 100 MG/DL
SPECIFIC GRAVITY URINE: 1.02
UROBILINOGEN URINE: 0.2 MG/DL

## 2025-06-19 ENCOUNTER — NON-APPOINTMENT (OUTPATIENT)
Age: 75
End: 2025-06-19

## 2025-07-31 ENCOUNTER — APPOINTMENT (OUTPATIENT)
Dept: ORTHOPEDIC SURGERY | Facility: CLINIC | Age: 75
End: 2025-07-31

## (undated) DEVICE — PACK CYSTO

## (undated) DEVICE — GLV 8 PROTEXIS (CREAM) MICRO

## (undated) DEVICE — IRR BULB PATHFINDER + 10"

## (undated) DEVICE — GOWN TRIMAX LG

## (undated) DEVICE — BOSTON SCIENTIFC PUMPING SYSTEM SAPS SINGLE ACTION 10CC

## (undated) DEVICE — ACMI SELF-SEALING SEAL UP TO 7FR

## (undated) DEVICE — POSITIONER STRAP ARMBOARD VELCRO TS-30

## (undated) DEVICE — VENODYNE/SCD SLEEVE CALF MEDIUM

## (undated) DEVICE — GLV 8 PROTEXIS (WHITE)

## (undated) DEVICE — ELCTR PLASMA BLADE X 3.0S WIDE TIP

## (undated) DEVICE — SOL IRR BAG H2O 3000ML

## (undated) DEVICE — SOL IRR POUR H2O 1500ML

## (undated) DEVICE — DRAPE IOBAN 33" X 23"

## (undated) DEVICE — GLV 7 PROTEXIS (WHITE)

## (undated) DEVICE — SOL IRR BAG NS 0.9% 3000ML

## (undated) DEVICE — DRAPE EXTREMITY 87" X 106" X 128"

## (undated) DEVICE — DRSG COBAN 4"

## (undated) DEVICE — DRSG DERMABOND 0.7ML

## (undated) DEVICE — GOWN XXL

## (undated) DEVICE — TAPE SILK 3"

## (undated) DEVICE — PREP CHLORAPREP HI-LITE ORANGE 26ML

## (undated) DEVICE — SOL IRR POUR NS 0.9% 1500ML

## (undated) DEVICE — SUT QUILL PDO 0 45CM 36MM

## (undated) DEVICE — ADAPTER CHECK FLO 9FR STERILE

## (undated) DEVICE — SUCTION YANKAUER NO CONTROL VENT

## (undated) DEVICE — WOUND IRR IRRISEPT W 0.5 CHG

## (undated) DEVICE — DRAPE LARGE SHEET 72X85"

## (undated) DEVICE — DRSG COBAN 6"

## (undated) DEVICE — LAP PAD W RING 18 X 18"

## (undated) DEVICE — Device

## (undated) DEVICE — ELCTR GROUNDING PAD ADULT COVIDIEN

## (undated) DEVICE — SUT QUILL MONODERM 3-0 30CM 19MM

## (undated) DEVICE — SYR LUER LOK 20CC

## (undated) DEVICE — DRAPE 3/4 SHEET 52X76"

## (undated) DEVICE — DRSG AQUACEL 3.5 X 10"

## (undated) DEVICE — SUT VICRYL 2-0 27" PS-2 UNDYED

## (undated) DEVICE — DRSG STOCKINETTE IMPERVIOUS XL 12 X 48"

## (undated) DEVICE — SUT PDO 2 1/2 CIRCLE 40MM NDL 45CM

## (undated) DEVICE — POSITIONER FOAM HEADREST (PINK)

## (undated) DEVICE — TUBING SUCTION 20FT

## (undated) DEVICE — WARMING BLANKET FULL ADULT

## (undated) DEVICE — DRAPE EQUIPMENT BANDED BAG 30 X 30" (SHOWER CAP)

## (undated) DEVICE — MAKO DRAPE KIT

## (undated) DEVICE — TOURNIQUET CUFF 34" DUAL PORT W PLC

## (undated) DEVICE — LABELS BLANK W PEN

## (undated) DEVICE — DRAPE IOBAN 23" X 23"

## (undated) DEVICE — CLAMP BX CUP 3FRX115CM OVL 85CM

## (undated) DEVICE — NDL HYPO SAFE 22G X 1.5" (BLACK)

## (undated) DEVICE — GLV 6.5 PROTEXIS (WHITE)

## (undated) DEVICE — MAKO VIZADISC KNEE TRACKING KIT

## (undated) DEVICE — VENODYNE/SCD SLEEVE CALF LARGE

## (undated) DEVICE — SUT VICRYL 1 36" CTX UNDYED

## (undated) DEVICE — TUBING RANGER FLUID IRRIGATION SET DISP

## (undated) DEVICE — TUBING SUCTION NONCONDUCTIVE 6MM X 12FT

## (undated) DEVICE — HOOD T5 PEELAWAY

## (undated) DEVICE — POSITIONER FOAM EGG CRATE ULNAR 2PCS (PINK)

## (undated) DEVICE — SAW BLADE STRYKER SAGITTAL 3 HOLE OSCILLATING

## (undated) DEVICE — DRSG KLING 4"

## (undated) DEVICE — FOLEY HOLDER STATLOCK 2 WAY ADULT

## (undated) DEVICE — GOWN SMARTGOWN RAGLAN XLG

## (undated) DEVICE — MAKO BLADE STANDARD

## (undated) DEVICE — POSITIONER CARDIAC BUMP

## (undated) DEVICE — PACK LIJ BASIC ORTHO

## (undated) DEVICE — GLV 7.5 PROTEXIS (WHITE)

## (undated) DEVICE — WARMING BLANKET UPPER ADULT